# Patient Record
Sex: FEMALE | Race: OTHER | HISPANIC OR LATINO | URBAN - METROPOLITAN AREA
[De-identification: names, ages, dates, MRNs, and addresses within clinical notes are randomized per-mention and may not be internally consistent; named-entity substitution may affect disease eponyms.]

---

## 2023-01-19 PROBLEM — Z00.00 ENCOUNTER FOR PREVENTIVE HEALTH EXAMINATION: Status: ACTIVE | Noted: 2023-01-19

## 2023-01-20 ENCOUNTER — OUTPATIENT (OUTPATIENT)
Dept: INPATIENT UNIT | Facility: HOSPITAL | Age: 33
LOS: 1 days | End: 2023-01-20
Payer: COMMERCIAL

## 2023-01-20 VITALS — SYSTOLIC BLOOD PRESSURE: 101 MMHG | HEART RATE: 94 BPM | DIASTOLIC BLOOD PRESSURE: 63 MMHG

## 2023-01-20 VITALS
TEMPERATURE: 98 F | RESPIRATION RATE: 18 BRPM | DIASTOLIC BLOOD PRESSURE: 60 MMHG | SYSTOLIC BLOOD PRESSURE: 101 MMHG | HEART RATE: 85 BPM

## 2023-01-20 DIAGNOSIS — O47.02 FALSE LABOR BEFORE 37 COMPLETED WEEKS OF GESTATION, SECOND TRIMESTER: ICD-10-CM

## 2023-01-20 LAB
A1C WITH ESTIMATED AVERAGE GLUCOSE RESULT: 4.8 % — SIGNIFICANT CHANGE UP (ref 4–5.6)
ABO RH CONFIRMATION: SIGNIFICANT CHANGE UP
APPEARANCE UR: CLEAR — SIGNIFICANT CHANGE UP
APTT BLD: 32.3 SEC — SIGNIFICANT CHANGE UP (ref 27.5–35.5)
BACTERIA # UR AUTO: ABNORMAL
BASOPHILS # BLD AUTO: 0.01 K/UL — SIGNIFICANT CHANGE UP (ref 0–0.2)
BASOPHILS NFR BLD AUTO: 0.1 % — SIGNIFICANT CHANGE UP (ref 0–2)
BILIRUB UR-MCNC: NEGATIVE — SIGNIFICANT CHANGE UP
BLD GP AB SCN SERPL QL: SIGNIFICANT CHANGE UP
COLOR SPEC: YELLOW — SIGNIFICANT CHANGE UP
COMMENT - URINE: SIGNIFICANT CHANGE UP
DIFF PNL FLD: ABNORMAL
EOSINOPHIL # BLD AUTO: 0.09 K/UL — SIGNIFICANT CHANGE UP (ref 0–0.5)
EOSINOPHIL NFR BLD AUTO: 1.3 % — SIGNIFICANT CHANGE UP (ref 0–6)
EPI CELLS # UR: ABNORMAL
ESTIMATED AVERAGE GLUCOSE: 91 MG/DL — SIGNIFICANT CHANGE UP (ref 68–114)
FIBRINOGEN PPP-MCNC: 808 MG/DL — CRITICAL HIGH (ref 330–520)
FIBRINOGEN PPP-MCNC: 818 MG/DL — CRITICAL HIGH (ref 330–520)
GLUCOSE UR QL: NEGATIVE MG/DL — SIGNIFICANT CHANGE UP
HCT VFR BLD CALC: 29.5 % — LOW (ref 34.5–45)
HCT VFR BLD CALC: 29.7 % — LOW (ref 34.5–45)
HGB BLD-MCNC: 10.6 G/DL — LOW (ref 11.5–15.5)
HGB BLD-MCNC: 10.7 G/DL — LOW (ref 11.5–15.5)
IMM GRANULOCYTES NFR BLD AUTO: 0.6 % — SIGNIFICANT CHANGE UP (ref 0–0.9)
INR BLD: 1.1 RATIO — SIGNIFICANT CHANGE UP (ref 0.88–1.16)
INR BLD: 1.12 RATIO — SIGNIFICANT CHANGE UP (ref 0.88–1.16)
KETONES UR-MCNC: NEGATIVE — SIGNIFICANT CHANGE UP
LEUKOCYTE ESTERASE UR-ACNC: ABNORMAL
LYMPHOCYTES # BLD AUTO: 1.36 K/UL — SIGNIFICANT CHANGE UP (ref 1–3.3)
LYMPHOCYTES # BLD AUTO: 19.3 % — SIGNIFICANT CHANGE UP (ref 13–44)
MCHC RBC-ENTMCNC: 33 PG — SIGNIFICANT CHANGE UP (ref 27–34)
MCHC RBC-ENTMCNC: 33.4 PG — SIGNIFICANT CHANGE UP (ref 27–34)
MCHC RBC-ENTMCNC: 35.7 GM/DL — SIGNIFICANT CHANGE UP (ref 32–36)
MCHC RBC-ENTMCNC: 36.3 GM/DL — HIGH (ref 32–36)
MCV RBC AUTO: 92.2 FL — SIGNIFICANT CHANGE UP (ref 80–100)
MCV RBC AUTO: 92.5 FL — SIGNIFICANT CHANGE UP (ref 80–100)
MONOCYTES # BLD AUTO: 0.29 K/UL — SIGNIFICANT CHANGE UP (ref 0–0.9)
MONOCYTES NFR BLD AUTO: 4.1 % — SIGNIFICANT CHANGE UP (ref 2–14)
NEUTROPHILS # BLD AUTO: 5.26 K/UL — SIGNIFICANT CHANGE UP (ref 1.8–7.4)
NEUTROPHILS NFR BLD AUTO: 74.6 % — SIGNIFICANT CHANGE UP (ref 43–77)
NITRITE UR-MCNC: NEGATIVE — SIGNIFICANT CHANGE UP
PH UR: 7 — SIGNIFICANT CHANGE UP (ref 5–8)
PLATELET # BLD AUTO: 202 K/UL — SIGNIFICANT CHANGE UP (ref 150–400)
PLATELET # BLD AUTO: 236 K/UL — SIGNIFICANT CHANGE UP (ref 150–400)
PROT UR-MCNC: NEGATIVE — SIGNIFICANT CHANGE UP
PROTHROM AB SERPL-ACNC: 12.8 SEC — SIGNIFICANT CHANGE UP (ref 10.5–13.4)
PROTHROM AB SERPL-ACNC: 13 SEC — SIGNIFICANT CHANGE UP (ref 10.5–13.4)
RBC # BLD: 3.2 M/UL — LOW (ref 3.8–5.2)
RBC # BLD: 3.21 M/UL — LOW (ref 3.8–5.2)
RBC # FLD: 13 % — SIGNIFICANT CHANGE UP (ref 10.3–14.5)
RBC # FLD: 13 % — SIGNIFICANT CHANGE UP (ref 10.3–14.5)
RBC CASTS # UR COMP ASSIST: ABNORMAL /HPF (ref 0–4)
SP GR SPEC: 1 — LOW (ref 1.01–1.02)
UROBILINOGEN FLD QL: NEGATIVE MG/DL — SIGNIFICANT CHANGE UP
WBC # BLD: 7.05 K/UL — SIGNIFICANT CHANGE UP (ref 3.8–10.5)
WBC # BLD: 7.45 K/UL — SIGNIFICANT CHANGE UP (ref 3.8–10.5)
WBC # FLD AUTO: 7.05 K/UL — SIGNIFICANT CHANGE UP (ref 3.8–10.5)
WBC # FLD AUTO: 7.45 K/UL — SIGNIFICANT CHANGE UP (ref 3.8–10.5)
WBC UR QL: ABNORMAL /HPF (ref 0–5)

## 2023-01-20 PROCEDURE — 87800 DETECT AGNT MULT DNA DIREC: CPT

## 2023-01-20 PROCEDURE — 86850 RBC ANTIBODY SCREEN: CPT

## 2023-01-20 PROCEDURE — 86901 BLOOD TYPING SEROLOGIC RH(D): CPT

## 2023-01-20 PROCEDURE — 85025 COMPLETE CBC W/AUTO DIFF WBC: CPT

## 2023-01-20 PROCEDURE — 87591 N.GONORRHOEAE DNA AMP PROB: CPT

## 2023-01-20 PROCEDURE — 36415 COLL VENOUS BLD VENIPUNCTURE: CPT

## 2023-01-20 PROCEDURE — 87491 CHLMYD TRACH DNA AMP PROBE: CPT

## 2023-01-20 PROCEDURE — 85730 THROMBOPLASTIN TIME PARTIAL: CPT

## 2023-01-20 PROCEDURE — 76816 OB US FOLLOW-UP PER FETUS: CPT | Mod: 26

## 2023-01-20 PROCEDURE — 85027 COMPLETE CBC AUTOMATED: CPT

## 2023-01-20 PROCEDURE — 81001 URINALYSIS AUTO W/SCOPE: CPT

## 2023-01-20 PROCEDURE — 86900 BLOOD TYPING SEROLOGIC ABO: CPT

## 2023-01-20 PROCEDURE — 85610 PROTHROMBIN TIME: CPT

## 2023-01-20 PROCEDURE — 85384 FIBRINOGEN ACTIVITY: CPT

## 2023-01-20 PROCEDURE — 83036 HEMOGLOBIN GLYCOSYLATED A1C: CPT

## 2023-01-20 RX ORDER — FERROUS SULFATE 325(65) MG
1 TABLET ORAL
Qty: 30 | Refills: 3
Start: 2023-01-20 | End: 2023-05-19

## 2023-01-20 NOTE — OB PROVIDER TRIAGE NOTE - NSHPPHYSICALEXAM_GEN_ALL_CORE
ICU Vital Signs Last 24 Hrs  T(C): 36.4 (20 Jan 2023 09:25), Max: 36.4 (20 Jan 2023 09:25)  T(F): 97.5 (20 Jan 2023 09:25), Max: 97.5 (20 Jan 2023 09:25)  HR: 85 (20 Jan 2023 09:31) (85 - 85)  BP: 101/60 (20 Jan 2023 09:31) (101/60 - 101/60)  RR: 18 (20 Jan 2023 09:25) (18 - 18)  O2 Parameters below as of 20 Jan 2023 09:25  Patient On (Oxygen Delivery Method): room air    CV: RRR  Pulm: breathing comfortably on RA  Abd: gravid, nontender  Extr: moving all extremities with ease   – Spec:   – VE: //  – FHT: baseline 140, mod variability, +accels, -decels  – [place]: No contractions noted  – Sono:

## 2023-01-20 NOTE — OB PROVIDER TRIAGE NOTE - NS_FINALEDD_OBGYN_ALL_OB_DT
Problem: Nutrition  Goal: Optimal nutrition therapy  Outcome: Ongoing  Note: Nutrition Problem #1: Inadequate oral intake  Intervention: Food and/or Nutrient Delivery: Continue Current Diet  Nutritional Goals: Pt will consume greater than 50% of meals this ARU stay 30-Apr-2023

## 2023-01-20 NOTE — OB PROVIDER TRIAGE NOTE - NSOBPROVIDERNOTE_OBGYN_ALL_OB_FT
33 y/o  at 25wks 5d gestation presents to triage for vaginal bleeding    - UA, CBC, T&S and coags collected will f/u results   - [place]:   - NST  - Sono       Discussed and seen with     ID: [id number] 33 y/o  at 25wks 5d gestation presents to triage for vaginal bleeding    - UA, CBC, T&S and coags collected will f/u results      ID: [id number], Nydia    Update by Ob Resident:  Labs with Hgb 10.7, coags within normal limits, O positive   MFM sono performed: anterior placenta, no previa; transverse maternal right presentation; EFW 1071g CWD, f gender, CL 3.57cm, MVP 8.37cm  SSE: old, brown blood visible in vaginal vault, nothing active from cervical os  VE: cervix soft, multiparous, closed    A/P:  - no active bleeding visualized on exam, cervix within normal limits on TVUS   - rpt abruption labs in 2h to assess for concealed abruption  - continuous fetal and uterine monitoring    discussed with Dr. Nick 31 y/o  at 25wks 5d gestation presents to triage for vaginal bleeding    - UA, CBC, T&S and coags collected will f/u results      ID: [id number], Nydia    Update by Ob Resident:  Labs with Hgb 10.7, coags within normal limits, O positive   MFM sono performed: anterior placenta, no previa; transverse maternal right presentation; EFW 1071g CWD, f gender, CL 3.57cm, MVP 8.37cm  SSE: old, brown blood visible in vaginal vault, nothing active from cervical os  VE: cervix soft, multiparous, closed    A/P:  - no active bleeding visualized on exam, cervix within normal limits on TVUS   - rpt abruption labs in 2h to assess for concealed abruption  - continuous fetal and uterine monitoring    discussed with Dr. Nick    Addendum:  - patient labs show Hgb stable without any signs of a concealed abruption. Patient was counseled to follow up outpatient and return to L&D for vaginal bleeding, leakage of fluid, contractions or decreased fetal movement.   - po iron was prescribed    d/w Dr. Nick

## 2023-01-20 NOTE — OB PROVIDER TRIAGE NOTE - HISTORY OF PRESENT ILLNESS
32 yoF  at gestational age presents to triage with reports of vaginal bleeding. patient states that when she woke up this morning she noticed there was blood on her bedsheets and underwear and noticed blood after wiping.  She declines vaginal bleeding now since she changed her underwear at 0830 AM today. She also states that she had abdominal cramping last night nothing today. She declines having sexual intercourse or anything in the vagina in the last 24 hrs, and declines any abdominal trauma. She recently came to the  from [place] where she had prenatal care and does not have any records with her. She was seen at [facility] clinic for confirmation for pregnancy and has a follow up appt for 23. +FM. -LOF. -CTXs. Pt denies any other concerns.    – PNC: Denies prenatal issues.   – OBHx: 2 , hx of 35 wk delivery  – GynHx: denies  – PMH: denies  – PSH: denies  – Psych: denies   – Social: denies   – Meds: Folic acid   – Allergies: NKDA

## 2023-01-21 LAB
C TRACH RRNA SPEC QL NAA+PROBE: SIGNIFICANT CHANGE UP
N GONORRHOEA RRNA SPEC QL NAA+PROBE: SIGNIFICANT CHANGE UP
SPECIMEN SOURCE: SIGNIFICANT CHANGE UP

## 2023-01-24 ENCOUNTER — APPOINTMENT (OUTPATIENT)
Dept: ANTEPARTUM | Facility: CLINIC | Age: 33
End: 2023-01-24
Payer: MEDICAID

## 2023-01-24 ENCOUNTER — ASOB RESULT (OUTPATIENT)
Age: 33
End: 2023-01-24

## 2023-01-24 PROCEDURE — 76811 OB US DETAILED SNGL FETUS: CPT

## 2023-03-10 ENCOUNTER — APPOINTMENT (OUTPATIENT)
Dept: MATERNAL FETAL MEDICINE | Facility: CLINIC | Age: 33
End: 2023-03-10

## 2023-03-14 ENCOUNTER — INPATIENT (INPATIENT)
Facility: HOSPITAL | Age: 33
LOS: 3 days | Discharge: HOME | DRG: 831 | End: 2023-03-18
Attending: OBSTETRICS & GYNECOLOGY | Admitting: OBSTETRICS & GYNECOLOGY
Payer: COMMERCIAL

## 2023-03-14 VITALS — DIASTOLIC BLOOD PRESSURE: 66 MMHG | SYSTOLIC BLOOD PRESSURE: 107 MMHG | HEART RATE: 86 BPM

## 2023-03-14 DIAGNOSIS — Z3A.33 33 WEEKS GESTATION OF PREGNANCY: ICD-10-CM

## 2023-03-14 DIAGNOSIS — O26.893 OTHER SPECIFIED PREGNANCY RELATED CONDITIONS, THIRD TRIMESTER: ICD-10-CM

## 2023-03-14 DIAGNOSIS — Z29.9 ENCOUNTER FOR PROPHYLACTIC MEASURES, UNSPECIFIED: ICD-10-CM

## 2023-03-14 DIAGNOSIS — O24.419 GESTATIONAL DIABETES MELLITUS IN PREGNANCY, UNSPECIFIED CONTROL: ICD-10-CM

## 2023-03-14 DIAGNOSIS — O60.03 PRETERM LABOR WITHOUT DELIVERY, THIRD TRIMESTER: ICD-10-CM

## 2023-03-14 DIAGNOSIS — O47.03 FALSE LABOR BEFORE 37 COMPLETED WEEKS OF GESTATION, THIRD TRIMESTER: ICD-10-CM

## 2023-03-14 DIAGNOSIS — E66.9 OBESITY, UNSPECIFIED: ICD-10-CM

## 2023-03-14 LAB
A1C WITH ESTIMATED AVERAGE GLUCOSE RESULT: 6 % — HIGH (ref 4–5.6)
ALBUMIN SERPL ELPH-MCNC: 3.4 G/DL — SIGNIFICANT CHANGE UP (ref 3.3–5.2)
ALP SERPL-CCNC: 325 U/L — HIGH (ref 40–120)
ALT FLD-CCNC: 9 U/L — SIGNIFICANT CHANGE UP
AMPHET UR-MCNC: NEGATIVE — SIGNIFICANT CHANGE UP
ANION GAP SERPL CALC-SCNC: 14 MMOL/L — SIGNIFICANT CHANGE UP (ref 5–17)
APPEARANCE UR: ABNORMAL
APTT BLD: 31.8 SEC — SIGNIFICANT CHANGE UP (ref 27.5–35.5)
AST SERPL-CCNC: 12 U/L — SIGNIFICANT CHANGE UP
BACTERIA # UR AUTO: ABNORMAL
BARBITURATES UR SCN-MCNC: NEGATIVE — SIGNIFICANT CHANGE UP
BASOPHILS # BLD AUTO: 0.01 K/UL — SIGNIFICANT CHANGE UP (ref 0–0.2)
BASOPHILS NFR BLD AUTO: 0.1 % — SIGNIFICANT CHANGE UP (ref 0–2)
BENZODIAZ UR-MCNC: NEGATIVE — SIGNIFICANT CHANGE UP
BILIRUB SERPL-MCNC: 0.4 MG/DL — SIGNIFICANT CHANGE UP (ref 0.4–2)
BILIRUB UR-MCNC: NEGATIVE — SIGNIFICANT CHANGE UP
BLD GP AB SCN SERPL QL: SIGNIFICANT CHANGE UP
BUN SERPL-MCNC: 5.4 MG/DL — LOW (ref 8–20)
CALCIUM SERPL-MCNC: 9 MG/DL — SIGNIFICANT CHANGE UP (ref 8.4–10.5)
CHLORIDE SERPL-SCNC: 101 MMOL/L — SIGNIFICANT CHANGE UP (ref 96–108)
CO2 SERPL-SCNC: 19 MMOL/L — LOW (ref 22–29)
COCAINE METAB.OTHER UR-MCNC: NEGATIVE — SIGNIFICANT CHANGE UP
COLOR SPEC: YELLOW — SIGNIFICANT CHANGE UP
COVID-19 SPIKE DOMAIN AB INTERP: POSITIVE
COVID-19 SPIKE DOMAIN ANTIBODY RESULT: >250 U/ML — HIGH
CREAT SERPL-MCNC: 0.39 MG/DL — LOW (ref 0.5–1.3)
DIFF PNL FLD: NEGATIVE — SIGNIFICANT CHANGE UP
EGFR: 136 ML/MIN/1.73M2 — SIGNIFICANT CHANGE UP
EOSINOPHIL # BLD AUTO: 0.07 K/UL — SIGNIFICANT CHANGE UP (ref 0–0.5)
EOSINOPHIL NFR BLD AUTO: 1 % — SIGNIFICANT CHANGE UP (ref 0–6)
EPI CELLS # UR: SIGNIFICANT CHANGE UP
ESTIMATED AVERAGE GLUCOSE: 126 MG/DL — HIGH (ref 68–114)
FIBRINOGEN PPP-MCNC: 575 MG/DL — HIGH (ref 200–450)
GLUCOSE SERPL-MCNC: 126 MG/DL — HIGH (ref 70–99)
GLUCOSE UR QL: NEGATIVE MG/DL — SIGNIFICANT CHANGE UP
HCT VFR BLD CALC: 35.5 % — SIGNIFICANT CHANGE UP (ref 34.5–45)
HGB BLD-MCNC: 12.3 G/DL — SIGNIFICANT CHANGE UP (ref 11.5–15.5)
HIV 1 & 2 AB SERPL IA.RAPID: SIGNIFICANT CHANGE UP
IMM GRANULOCYTES NFR BLD AUTO: 0.7 % — SIGNIFICANT CHANGE UP (ref 0–0.9)
KETONES UR-MCNC: ABNORMAL
LEUKOCYTE ESTERASE UR-ACNC: ABNORMAL
LYMPHOCYTES # BLD AUTO: 1.59 K/UL — SIGNIFICANT CHANGE UP (ref 1–3.3)
LYMPHOCYTES # BLD AUTO: 21.6 % — SIGNIFICANT CHANGE UP (ref 13–44)
MCHC RBC-ENTMCNC: 32.5 PG — SIGNIFICANT CHANGE UP (ref 27–34)
MCHC RBC-ENTMCNC: 34.6 GM/DL — SIGNIFICANT CHANGE UP (ref 32–36)
MCV RBC AUTO: 93.7 FL — SIGNIFICANT CHANGE UP (ref 80–100)
METHADONE UR-MCNC: NEGATIVE — SIGNIFICANT CHANGE UP
MONOCYTES # BLD AUTO: 0.36 K/UL — SIGNIFICANT CHANGE UP (ref 0–0.9)
MONOCYTES NFR BLD AUTO: 4.9 % — SIGNIFICANT CHANGE UP (ref 2–14)
NEUTROPHILS # BLD AUTO: 5.27 K/UL — SIGNIFICANT CHANGE UP (ref 1.8–7.4)
NEUTROPHILS NFR BLD AUTO: 71.7 % — SIGNIFICANT CHANGE UP (ref 43–77)
NITRITE UR-MCNC: NEGATIVE — SIGNIFICANT CHANGE UP
OPIATES UR-MCNC: NEGATIVE — SIGNIFICANT CHANGE UP
PCP SPEC-MCNC: SIGNIFICANT CHANGE UP
PCP UR-MCNC: NEGATIVE — SIGNIFICANT CHANGE UP
PH UR: 7 — SIGNIFICANT CHANGE UP (ref 5–8)
PLATELET # BLD AUTO: 249 K/UL — SIGNIFICANT CHANGE UP (ref 150–400)
POTASSIUM SERPL-MCNC: 3.5 MMOL/L — SIGNIFICANT CHANGE UP (ref 3.5–5.3)
POTASSIUM SERPL-SCNC: 3.5 MMOL/L — SIGNIFICANT CHANGE UP (ref 3.5–5.3)
PROT SERPL-MCNC: 6.8 G/DL — SIGNIFICANT CHANGE UP (ref 6.6–8.7)
PROT UR-MCNC: 30 MG/DL
RBC # BLD: 3.79 M/UL — LOW (ref 3.8–5.2)
RBC # FLD: 12.9 % — SIGNIFICANT CHANGE UP (ref 10.3–14.5)
RBC CASTS # UR COMP ASSIST: NEGATIVE /HPF — SIGNIFICANT CHANGE UP (ref 0–4)
SARS-COV-2 IGG+IGM SERPL QL IA: >250 U/ML — HIGH
SARS-COV-2 IGG+IGM SERPL QL IA: POSITIVE
SARS-COV-2 RNA SPEC QL NAA+PROBE: SIGNIFICANT CHANGE UP
SODIUM SERPL-SCNC: 134 MMOL/L — LOW (ref 135–145)
SP GR SPEC: 1.01 — SIGNIFICANT CHANGE UP (ref 1.01–1.02)
THC UR QL: NEGATIVE — SIGNIFICANT CHANGE UP
URATE SERPL-MCNC: 2.4 MG/DL — SIGNIFICANT CHANGE UP (ref 2.4–5.7)
UROBILINOGEN FLD QL: NEGATIVE MG/DL — SIGNIFICANT CHANGE UP
WBC # BLD: 7.35 K/UL — SIGNIFICANT CHANGE UP (ref 3.8–10.5)
WBC # FLD AUTO: 7.35 K/UL — SIGNIFICANT CHANGE UP (ref 3.8–10.5)
WBC UR QL: ABNORMAL /HPF (ref 0–5)

## 2023-03-14 PROCEDURE — 99233 SBSQ HOSP IP/OBS HIGH 50: CPT | Mod: GC

## 2023-03-14 RX ORDER — DEXTROSE 50 % IN WATER 50 %
25 SYRINGE (ML) INTRAVENOUS ONCE
Refills: 0 | Status: DISCONTINUED | OUTPATIENT
Start: 2023-03-14 | End: 2023-03-18

## 2023-03-14 RX ORDER — SODIUM CHLORIDE 9 MG/ML
1000 INJECTION, SOLUTION INTRAVENOUS
Refills: 0 | Status: DISCONTINUED | OUTPATIENT
Start: 2023-03-14 | End: 2023-03-15

## 2023-03-14 RX ORDER — AMPICILLIN TRIHYDRATE 250 MG
2 CAPSULE ORAL ONCE
Refills: 0 | Status: COMPLETED | OUTPATIENT
Start: 2023-03-14 | End: 2023-03-14

## 2023-03-14 RX ORDER — NIFEDIPINE 30 MG
30 TABLET, EXTENDED RELEASE 24 HR ORAL ONCE
Refills: 0 | Status: COMPLETED | OUTPATIENT
Start: 2023-03-14 | End: 2023-03-14

## 2023-03-14 RX ORDER — DEXTROSE 50 % IN WATER 50 %
12.5 SYRINGE (ML) INTRAVENOUS ONCE
Refills: 0 | Status: DISCONTINUED | OUTPATIENT
Start: 2023-03-14 | End: 2023-03-18

## 2023-03-14 RX ORDER — DEXTROSE 50 % IN WATER 50 %
15 SYRINGE (ML) INTRAVENOUS ONCE
Refills: 0 | Status: DISCONTINUED | OUTPATIENT
Start: 2023-03-14 | End: 2023-03-18

## 2023-03-14 RX ORDER — SODIUM CHLORIDE 9 MG/ML
1000 INJECTION, SOLUTION INTRAVENOUS
Refills: 0 | Status: DISCONTINUED | OUTPATIENT
Start: 2023-03-14 | End: 2023-03-18

## 2023-03-14 RX ORDER — GLUCAGON INJECTION, SOLUTION 0.5 MG/.1ML
1 INJECTION, SOLUTION SUBCUTANEOUS ONCE
Refills: 0 | Status: DISCONTINUED | OUTPATIENT
Start: 2023-03-14 | End: 2023-03-18

## 2023-03-14 RX ORDER — BUTORPHANOL TARTRATE 2 MG/ML
2 INJECTION, SOLUTION INTRAMUSCULAR; INTRAVENOUS ONCE
Refills: 0 | Status: DISCONTINUED | OUTPATIENT
Start: 2023-03-14 | End: 2023-03-14

## 2023-03-14 RX ORDER — NIFEDIPINE 30 MG
20 TABLET, EXTENDED RELEASE 24 HR ORAL EVERY 6 HOURS
Refills: 0 | Status: DISCONTINUED | OUTPATIENT
Start: 2023-03-14 | End: 2023-03-15

## 2023-03-14 RX ORDER — INSULIN LISPRO 100/ML
VIAL (ML) SUBCUTANEOUS
Refills: 0 | Status: DISCONTINUED | OUTPATIENT
Start: 2023-03-14 | End: 2023-03-15

## 2023-03-14 RX ORDER — AMPICILLIN TRIHYDRATE 250 MG
1 CAPSULE ORAL EVERY 4 HOURS
Refills: 0 | Status: DISCONTINUED | OUTPATIENT
Start: 2023-03-14 | End: 2023-03-15

## 2023-03-14 RX ORDER — SODIUM CHLORIDE 9 MG/ML
1000 INJECTION, SOLUTION INTRAVENOUS ONCE
Refills: 0 | Status: COMPLETED | OUTPATIENT
Start: 2023-03-14 | End: 2023-03-14

## 2023-03-14 RX ORDER — INSULIN LISPRO 100/ML
VIAL (ML) SUBCUTANEOUS AT BEDTIME
Refills: 0 | Status: DISCONTINUED | OUTPATIENT
Start: 2023-03-14 | End: 2023-03-15

## 2023-03-14 RX ADMIN — Medication 12 MILLIGRAM(S): at 15:03

## 2023-03-14 RX ADMIN — Medication 200 GRAM(S): at 15:00

## 2023-03-14 RX ADMIN — SODIUM CHLORIDE 1000 MILLILITER(S): 9 INJECTION, SOLUTION INTRAVENOUS at 10:50

## 2023-03-14 RX ADMIN — Medication 20 MILLIGRAM(S): at 21:58

## 2023-03-14 RX ADMIN — Medication 108 GRAM(S): at 18:45

## 2023-03-14 RX ADMIN — Medication 30 MILLIGRAM(S): at 16:04

## 2023-03-14 RX ADMIN — Medication 1 TABLET(S): at 21:58

## 2023-03-14 RX ADMIN — BUTORPHANOL TARTRATE 2 MILLIGRAM(S): 2 INJECTION, SOLUTION INTRAMUSCULAR; INTRAVENOUS at 11:41

## 2023-03-14 RX ADMIN — Medication 108 GRAM(S): at 21:58

## 2023-03-14 NOTE — CONSULT NOTE ADULT - ATTENDING COMMENTS
Pregnancy complicated by  labor and other comorbidities. Tocolysis, GBS prophylaxis, and steroids for fetal lung maturity.

## 2023-03-14 NOTE — OB PROVIDER TRIAGE NOTE - NSHPPHYSICALEXAM_GEN_ALL_CORE
T(C): 36.8 (03-14-23 @ 10:32), Max: 36.8 (03-14-23 @ 10:32)  HR: 74 (03-14-23 @ 11:12) (72 - 86)  BP: 107/66 (03-14-23 @ 10:32) (107/66 - 107/66)  RR: 19 (03-14-23 @ 10:32) (19 - 19)  SpO2: 98% (03-14-23 @ 11:07) (98% - 98%)  Gen: NAD, well-appearing  Heart: S1 S2, RRR  Lungs: CTAB  Abd: soft, gravid  Ext: non-edematous, non-tender   SVE: closed  SSE: cervix visualized, closed and without any signs of bleeding or drainage, no pooling   FHT: Baseline 160bpm. moderate variability. +accels -decels T(C): 36.8 (03-14-23 @ 10:32), Max: 36.8 (03-14-23 @ 10:32)  HR: 74 (03-14-23 @ 11:12) (72 - 86)  BP: 107/66 (03-14-23 @ 10:32) (107/66 - 107/66)  RR: 19 (03-14-23 @ 10:32) (19 - 19)  SpO2: 98% (03-14-23 @ 11:07) (98% - 98%)    Gen: NAD, well-appearing, resting comfortably on room air   Abd: soft, gravid  Ext: non-edematous, non-tender   SVE: 0/0/-3  FHT: Baseline 160bpm. moderate variability. +accels -decels

## 2023-03-14 NOTE — CONSULT NOTE ADULT - PROBLEM SELECTOR RECOMMENDATION 2
- will start fingersticks every 4 hours while NPO  - will do fingersticks pre and post meals, fasting and at bedtime - will start fingersticks every 4 hours while NPO  - will do fingersticks pre and post meals, fasting and at bedtime when eating

## 2023-03-14 NOTE — OB PROVIDER H&P - ASSESSMENT
Patient is a 32 year old  at 33weeks 6days who presented to L&D for contractions and now admitted for  labor after making cervical change.     - MFM consult  - gbs prophylaxis, ampicillin, tocolysis  - fingersticks q4 and pre/post meals for GDMA  - continuous monitoring    d/w Dr. Sauceda

## 2023-03-14 NOTE — OB RN TRIAGE NOTE - NS_ACCOMPAINEDBY_OBGYN_ALL_OB
Pt aware of message below and verbalized understanding. No further questions or concerns from pt at this time. Self

## 2023-03-14 NOTE — OB PROVIDER H&P - NSHPPHYSICALEXAM_GEN_ALL_CORE
Vital Signs Last 24 Hrs  T(C): 36.8 (14 Mar 2023 13:42), Max: 36.8 (14 Mar 2023 10:32)  T(F): 98.2 (14 Mar 2023 13:42), Max: 98.2 (14 Mar 2023 10:32)  HR: 90 (14 Mar 2023 13:44) (72 - 90)  BP: 111/75 (14 Mar 2023 13:44) (107/66 - 111/75)  RR: 18 (14 Mar 2023 16:00) (17 - 19)  SpO2: 99% (14 Mar 2023 11:28) (98% - 99%)  Gen: NAD, well-appearing, resting comfortably on room air   Abd: soft, gravid  Ext: non-edematous, non-tender   SVE: 0/0/-3 > 2/70/-  FHT: Baseline 160bpm. moderate variability. +accels -decels

## 2023-03-14 NOTE — CONSULT NOTE ADULT - ASSESSMENT
Patient is a 32 year old  at 33weeks 6days who presented to L&D for contractions. She received 2mg stadol for pain, however she continued to have pain and contractions every 2 minutes.  Patient is a 32 year old  at 33weeks 6days who presented to L&D with premature uterine contractions. She received 2mg Stadol for pain. She has  labor.

## 2023-03-14 NOTE — CONSULT NOTE ADULT - SUBJECTIVE AND OBJECTIVE BOX
KATE HUGGINS is a 32y  at 33weeks 6days GA who presents to L&D for contractions and low back pain every 5 minutes since yesterday morning (>24h ago), became intolerable at 4AM this morning, rates as 6/10 pain. Also endorsing pressure described as an urge to urinate and pass a bowel movement. Patient denies vaginal bleeding or leakage of fluid. She endorses good fetal movement, last felt earlier this morning. Patient denies any trauma, visual disturbances, RUQ pain, epigastric pain, or new-onset edema. She denies any urinary complaints, fevers, chills, nausea, vomiting, chest pain, or palpitations.    KIERA: 23  LMP: 22    Pregnancy course is significant for:   - GDMA1    POB:  x2 G2:  @ 36w 2/2 vaginal bleeding in 2nd and 3rd trimester  PGYN: -fibroids/-cysts, denied STD hx, denies abnormal PAPs  PMH: denies   PSH: denies   SH: Denies tobacco use, EtOH use and illicit drug use during the pregnancy; Feels safe at home  Meds: Prenatal vitamins  All: NKDA    Vital Signs Last 24 Hrs  T(C): 36.8 (14 Mar 2023 13:42), Max: 36.8 (14 Mar 2023 10:32)  T(F): 98.2 (14 Mar 2023 13:42), Max: 98.2 (14 Mar 2023 10:32)  HR: 90 (14 Mar 2023 13:44) (72 - 90)  BP: 111/75 (14 Mar 2023 13:44) (107/66 - 111/75)  RR: 18 (14 Mar 2023 13:42) (18 - 19)  SpO2: 99% (14 Mar 2023 11:28) (98% - 99%)    Gen: NAD, well-appearing, resting comfortably on room air   Abd: soft, gravid  Ext: non-edematous, non-tender   SVE: 2/70/-2  FHT: Baseline 160bpm. moderate variability. +accels -decels  toco: contractions q2 min     KATE HUGGINS is a 32y  at 33weeks 6days GA who presents to L&D with back pain due to regular uterine contractions every 5 minutes since yesterday morning (>24h ago). Uterine contraction intensity increased this morning at 4 AM, rates as 6/10 pain. Also endorsing pelvic pressure described as an urge to urinate and have a bowel movement. Patient denies vaginal bleeding or leakage of fluid. She endorses good fetal movement, last felt earlier this morning. Patient denies any trauma, visual disturbances, RUQ pain, epigastric pain, or new-onset edema. She denies any urinary complaints, fevers, chills, nausea, vomiting, chest pain, or palpitations.    KIERA: 23  LMP: 22    Pregnancy course is significant for:   - GDMA1    POB:  x2 G2:  @ 36w 2/2 vaginal bleeding in 2nd and 3rd trimester  PGYN: -fibroids/-cysts, denied STD hx, denies abnormal PAPs  PMH: denies   PSH: denies   SH: Denies tobacco use, EtOH use and illicit drug use during the pregnancy; Feels safe at home  Meds: Prenatal vitamins  All: NKDA    Vital Signs Last 24 Hrs  T(C): 36.8 (14 Mar 2023 13:42), Max: 36.8 (14 Mar 2023 10:32)  T(F): 98.2 (14 Mar 2023 13:42), Max: 98.2 (14 Mar 2023 10:32)  HR: 90 (14 Mar 2023 13:44) (72 - 90)  BP: 111/75 (14 Mar 2023 13:44) (107/66 - 111/75)  RR: 18 (14 Mar 2023 13:42) (18 - 19)  SpO2: 99% (14 Mar 2023 11:28) (98% - 99%)    Gen: NAD, well-appearing, resting comfortably on room air   Abd: soft, gravid  Ext: non-edematous, non-tender   SVE: 2/70/-2  FHT: Baseline 160bpm. moderate variability. +accels -decels  toco: contractions q2 min

## 2023-03-14 NOTE — OB PROVIDER TRIAGE NOTE - NSOBPROVIDERNOTE_OBGYN_ALL_OB_FT
A/P: 32 year old female  presenting to L&D for rule out labor. Describes contractions every 5 minutes since yesterday morning, worse today at 4AM without any leakage of fluid or vaginal bleeding.      Fetus: 160bpm. moderate variability. +accels -decels     Dispo:   Give LR 1L IV  Give Stadol for pain control  Check labs   Continue to observe.     Discussed with  A/P: 32 year old female  presenting to L&D for rule out labor. Describes contractions every 5 minutes since yesterday morning, worse today at 4AM without any leakage of fluid or vaginal bleeding.      Fetus: 160bpm. moderate variability. +accels -decels     Dispo:   Give LR 1L IV  Give Stadol for pain control  Check cultures  Continue to observe, reassess in 2 hours.     Discussed with  A/P: 32 year old female  at 33w6d presenting to L&D for rule out  labor.    -Give LR 1L bolus  -Give Stadol for pain control  - Pending GBS, Affirm, and GC/CT  - Will reexamine in 2-3 hours for cervical change    Fetus: reactive   Dispo: Continue to observe    Discussed with Dr. Sauceda

## 2023-03-14 NOTE — OB RN TRIAGE NOTE - FALL HARM RISK - UNIVERSAL INTERVENTIONS
Bed in lowest position, wheels locked, appropriate side rails in place/Call bell, personal items and telephone in reach/Instruct patient to call for assistance before getting out of bed or chair/Non-slip footwear when patient is out of bed/[place] to call system/Physically safe environment - no spills, clutter or unnecessary equipment/Purposeful Proactive Rounding/Room/bathroom lighting operational, light cord in reach

## 2023-03-14 NOTE — CONSULT NOTE ADULT - PROBLEM SELECTOR RECOMMENDATION 9
Patient made cervical change from 0 cm to 2cm.  - recommend to start betamethasone course for fetal lung maturity  - ampicillin for GBS prophylaxis  - nifedipine 30 mg loading, 20mg q6hrs for tocolysis

## 2023-03-14 NOTE — CONSULT NOTE ADULT - PROBLEM SELECTOR RECOMMENDATION 5
FIRST PROVIDER CONTACT ASSESSMENT NOTE      Department of Emergency Medicine   2/11/21  5:51 PM EST    Chief Complaint: Headache and Hypertension      History of Present Illness:   Ena Song is a 32 y.o. female who presents to the ED for elevated blood pressure. She was at the gynecologist today and her blood pressure was 150s over 90s. She states she did have a headache but it was due to not wearing her glasses for the past 2 days. She denies any new vision changes, neck pain, shortness breath or chest pain. Currently not on any medications. Medical History:  has no past medical history on file. Surgical History:  has a past surgical history that includes [place] tooth extraction. Social History:  reports that she has been smoking. She has never used smokeless tobacco. She reports current alcohol use. She reports that she does not use drugs. Family History: family history is not on file. *ALLERGIES*     Patient has no known allergies.      Physical Exam:      VS:  BP (!) 145/92   Pulse 93   Temp 97.1 °F (36.2 °C)   Resp 16   Ht 5' 7\" (1.702 m)   Wt (!) 323 lb (146.5 kg)   LMP 02/01/2021 (Exact Date)   SpO2 97%   BMI 50.59 kg/m²      Initial Plan of Care:  Initiate Treatment-Testing, Proceed toTreatment Area When Bed Available for ED Attending/MLP to Continue Care    -----------------END OF FIRST PROVIDER CONTACT ASSESSMENT NOTE--------------  Electronically signed by DONTAE Rubio CNP   DD: 2/11/21             DONTAE Rubio CNP  02/11/21 [id number]
no acute intervention

## 2023-03-14 NOTE — OB PROVIDER H&P - NSLOWPPHRISK_OBGYN_A_OB
No previous uterine incision/V[place] Pregnancy/Less than or equal to 4 previous vaginal births/No known bleeding disorder/No history of postpartum hemorrhage/No other PPH risks indicated

## 2023-03-14 NOTE — OB PROVIDER TRIAGE NOTE - CURRENT PREGNANCY COMPLICATIONS, OB PROFILE
Gestational Diabetes/Gestational Age less than 36 Weeks/Maternal Unknown GBS Subsequent Stages Histo Method Verbiage: Using a similar technique to that described above, a thin layer of tissue was removed from all areas where tumor was visible on the previous stage.  The tissue was again oriented, mapped, dyed, and processed as above.

## 2023-03-14 NOTE — OB PROVIDER H&P - HISTORY OF PRESENT ILLNESS
KATE HUGGINS is a 32y  at 33weeks 6days GA who presents to L&D for contractions and low back pain every 5 minutes since yesterday morning (>24h ago), became intolerable at 4AM this morning, rates as 6/10 pain. Also endorsing pressure described as an urge to urinate and pass a bowel movement. Patient denies vaginal bleeding or leakage of fluid. She endorses good fetal movement, last felt earlier this morning. Patient denies any trauma, visual disturbances, RUQ pain, epigastric pain, or new-onset edema. She denies any urinary complaints, fevers, chills, nausea, vomiting, chest pain, or palpitations.    Pregnancy course is significant for: GDMA1    POB:   G1: 2009 - normal spontaneous vaginal delivery @ 40w, 7lbs  G2: 2015 - normal spontaneous vaginal delivery @ 36w, 4nxa8an  PGYN: -fibroids/-cysts, denied STD hx, denies abnormal PAPs  PMH: denies   PSH: denies   SH: Denies tobacco use, EtOH use and illicit drug use during the pregnancy; Feels safe at home  Meds: Prenatal vitamins  All: NKDA

## 2023-03-14 NOTE — OB PROVIDER TRIAGE NOTE - HISTORY OF PRESENT ILLNESS
KATE HUGGINS is a 32y  at 33weeks 6days GA who presents to L&D for rule out labor. Patient reports contractions and low back pain every 5 minutes since yesterday morning (>24h ago), became intolerable at 4AM this morning. Also endorsing pressure described as an urge to urinate and pass a bowel movement, shortness of breath, and a headache. Patient denies vaginal bleeding or leakage of fluid. She endorses good fetal movement, last felt earlier this morning. Patient denies any trauma, visual disturbances, RUQ pain, epigastric pain, or new-onset edema. She denies any urinary complaints, fevers, chills, nausea, vomiting, chest pain, or palpitations.    Pregnancy course is significant for: GDM1    POB: unknown GBS. Presented to L&D 2023 for episode of vaginal bleeding, ruled out concealed abruption.   PGYN: -fibroids/-cysts, denied STD hx, denies abnormal PAPs  PMH: none  PSH: none  SH: Denies tobacco use, EtOH use and illicit drug use during the pregnancy; Feels safe at home  Meds: Prenatal vitamins  All: none KATE HUGGINS is a 32y  at 33weeks 6days GA who presents to L&D for rule out labor. Patient reports contractions and low back pain every 5 minutes since yesterday morning (>24h ago), became intolerable at 4AM this morning, rates as 6/10 pain. Also endorsing pressure described as an urge to urinate and pass a bowel movement, shortness of breath, and a headache. Patient denies vaginal bleeding or leakage of fluid. She endorses good fetal movement, last felt earlier this morning. Patient denies any trauma, visual disturbances, RUQ pain, epigastric pain, or new-onset edema. She denies any urinary complaints, fevers, chills, nausea, vomiting, chest pain, or palpitations.    Pregnancy course is significant for: GDM1    POB: unknown GBS. Presented to L&D 2023 for episode of vaginal bleeding, ruled out concealed abruption.   PGYN: -fibroids/-cysts, denied STD hx, denies abnormal PAPs  PMH: none  PSH: none  SH: Denies tobacco use, EtOH use and illicit drug use during the pregnancy; Feels safe at home  Meds: Prenatal vitamins  All: none KATE HUGGINS is a 32y  at 33weeks 6days GA who presents to L&D for contractions and low back pain every 5 minutes since yesterday morning (>24h ago), became intolerable at 4AM this morning, rates as 6/10 pain. Also endorsing pressure described as an urge to urinate and pass a bowel movement. Patient denies vaginal bleeding or leakage of fluid. She endorses good fetal movement, last felt earlier this morning. Patient denies any trauma, visual disturbances, RUQ pain, epigastric pain, or new-onset edema. She denies any urinary complaints, fevers, chills, nausea, vomiting, chest pain, or palpitations.    Pregnancy course is significant for: GDM1    POB:  x2 G2:  @ 36w 2/2 vaginal bleeding in 2nd and 3rd trimester  PGYN: -fibroids/-cysts, denied STD hx, denies abnormal PAPs  PMH: denies   PSH: denies   SH: Denies tobacco use, EtOH use and illicit drug use during the pregnancy; Feels safe at home  Meds: Prenatal vitamins  All: NKDA

## 2023-03-15 ENCOUNTER — TRANSCRIPTION ENCOUNTER (OUTPATIENT)
Age: 33
End: 2023-03-15

## 2023-03-15 DIAGNOSIS — Z3A.34 34 WEEKS GESTATION OF PREGNANCY: ICD-10-CM

## 2023-03-15 LAB
APTT BLD: 29.6 SEC — SIGNIFICANT CHANGE UP (ref 27.5–35.5)
BASOPHILS # BLD AUTO: 0.01 K/UL — SIGNIFICANT CHANGE UP (ref 0–0.2)
BASOPHILS NFR BLD AUTO: 0.1 % — SIGNIFICANT CHANGE UP (ref 0–2)
C TRACH RRNA SPEC QL NAA+PROBE: SIGNIFICANT CHANGE UP
CANDIDA AB TITR SER: SIGNIFICANT CHANGE UP
EOSINOPHIL # BLD AUTO: 0 K/UL — SIGNIFICANT CHANGE UP (ref 0–0.5)
EOSINOPHIL NFR BLD AUTO: 0 % — SIGNIFICANT CHANGE UP (ref 0–6)
FIBRINOGEN PPP-MCNC: 638 MG/DL — HIGH (ref 200–450)
G VAGINALIS DNA SPEC QL NAA+PROBE: SIGNIFICANT CHANGE UP
HCT VFR BLD CALC: 33.6 % — LOW (ref 34.5–45)
HGB BLD-MCNC: 11.9 G/DL — SIGNIFICANT CHANGE UP (ref 11.5–15.5)
IMM GRANULOCYTES NFR BLD AUTO: 0.6 % — SIGNIFICANT CHANGE UP (ref 0–0.9)
INR BLD: 1.05 RATIO — SIGNIFICANT CHANGE UP (ref 0.88–1.16)
LYMPHOCYTES # BLD AUTO: 1.35 K/UL — SIGNIFICANT CHANGE UP (ref 1–3.3)
LYMPHOCYTES # BLD AUTO: 17.4 % — SIGNIFICANT CHANGE UP (ref 13–44)
MCHC RBC-ENTMCNC: 33.1 PG — SIGNIFICANT CHANGE UP (ref 27–34)
MCHC RBC-ENTMCNC: 35.4 GM/DL — SIGNIFICANT CHANGE UP (ref 32–36)
MCV RBC AUTO: 93.6 FL — SIGNIFICANT CHANGE UP (ref 80–100)
MONOCYTES # BLD AUTO: 0.37 K/UL — SIGNIFICANT CHANGE UP (ref 0–0.9)
MONOCYTES NFR BLD AUTO: 4.8 % — SIGNIFICANT CHANGE UP (ref 2–14)
N GONORRHOEA RRNA SPEC QL NAA+PROBE: SIGNIFICANT CHANGE UP
NEUTROPHILS # BLD AUTO: 6 K/UL — SIGNIFICANT CHANGE UP (ref 1.8–7.4)
NEUTROPHILS NFR BLD AUTO: 77.1 % — HIGH (ref 43–77)
PLATELET # BLD AUTO: 208 K/UL — SIGNIFICANT CHANGE UP (ref 150–400)
PROTHROM AB SERPL-ACNC: 12.2 SEC — SIGNIFICANT CHANGE UP (ref 10.5–13.4)
RBC # BLD: 3.59 M/UL — LOW (ref 3.8–5.2)
RBC # FLD: 12.6 % — SIGNIFICANT CHANGE UP (ref 10.3–14.5)
SPECIMEN SOURCE: SIGNIFICANT CHANGE UP
T PALLIDUM AB TITR SER: NEGATIVE — SIGNIFICANT CHANGE UP
T VAGINALIS SPEC QL WET PREP: SIGNIFICANT CHANGE UP
WBC # BLD: 7.78 K/UL — SIGNIFICANT CHANGE UP (ref 3.8–10.5)
WBC # FLD AUTO: 7.78 K/UL — SIGNIFICANT CHANGE UP (ref 3.8–10.5)

## 2023-03-15 PROCEDURE — 99232 SBSQ HOSP IP/OBS MODERATE 35: CPT | Mod: GC

## 2023-03-15 RX ORDER — ACETAMINOPHEN 500 MG
975 TABLET ORAL ONCE
Refills: 0 | Status: DISCONTINUED | OUTPATIENT
Start: 2023-03-15 | End: 2023-03-18

## 2023-03-15 RX ORDER — METFORMIN HYDROCHLORIDE 850 MG/1
500 TABLET ORAL EVERY 12 HOURS
Refills: 0 | Status: DISCONTINUED | OUTPATIENT
Start: 2023-03-15 | End: 2023-03-16

## 2023-03-15 RX ORDER — INSULIN LISPRO 100/ML
VIAL (ML) SUBCUTANEOUS
Refills: 0 | Status: DISCONTINUED | OUTPATIENT
Start: 2023-03-15 | End: 2023-03-18

## 2023-03-15 RX ORDER — INSULIN HUMAN 100 [IU]/ML
8 INJECTION, SOLUTION SUBCUTANEOUS ONCE
Refills: 0 | Status: COMPLETED | OUTPATIENT
Start: 2023-03-15 | End: 2023-03-15

## 2023-03-15 RX ADMIN — Medication 6: at 18:49

## 2023-03-15 RX ADMIN — Medication 4: at 13:10

## 2023-03-15 RX ADMIN — Medication 20 MILLIGRAM(S): at 04:01

## 2023-03-15 RX ADMIN — Medication 108 GRAM(S): at 14:15

## 2023-03-15 RX ADMIN — INSULIN HUMAN 8 UNIT(S): 100 INJECTION, SOLUTION SUBCUTANEOUS at 23:05

## 2023-03-15 RX ADMIN — Medication 20 MILLIGRAM(S): at 10:13

## 2023-03-15 RX ADMIN — Medication 12 MILLIGRAM(S): at 14:53

## 2023-03-15 RX ADMIN — Medication 108 GRAM(S): at 06:32

## 2023-03-15 RX ADMIN — Medication 1 TABLET(S): at 13:12

## 2023-03-15 RX ADMIN — Medication 2: at 08:45

## 2023-03-15 RX ADMIN — Medication 108 GRAM(S): at 02:20

## 2023-03-15 RX ADMIN — SODIUM CHLORIDE 125 MILLILITER(S): 9 INJECTION, SOLUTION INTRAVENOUS at 02:20

## 2023-03-15 RX ADMIN — Medication 108 GRAM(S): at 09:59

## 2023-03-15 NOTE — DISCHARGE NOTE ANTEPARTUM - PLAN OF CARE
1) Please take your insulin as prescribed.  2) Please follow you finger sticks in the morning and 1 hour after eating.   3) Please follow a diabetic diet. 1) Nothing in the vagina for 6 weeks (including no sex, no tampons, and no douching).  2) Please call your doctor for a follow up your appointment in 1 week.   3) Please continue taking vitamins postpartum.   4) Please call the office sooner if you have heavy vaginal bleeding, severe abdominal pain, or fever > 100.4F.  5) You may resume regular activity as tolerated

## 2023-03-15 NOTE — DISCHARGE NOTE ANTEPARTUM - PATIENT PORTAL LINK FT
You can access the FollowMyHealth Patient Portal offered by [facility] by registering at the following website: [email or web address]. By joining [facility]’s FollowMyHealth portal, you will also be able to view your health information using other applications (apps) compatible with our system.

## 2023-03-15 NOTE — PROGRESS NOTE ADULT - PROBLEM SELECTOR PLAN 3
-SVE 0cm to 2cm on 3/15  -No contractions on toco overnight, patient declined contractions overnight   -Vaginal -SVE 0cm to 2cm on 3/14 > 3/70/-2 on 3/15  -No contractions on toco overnight, patient declined contractions overnight   -Vaginal bleeding overnight: SSE done, dark red blood from cervical os noted, amnisure negative   -Bedside sono with normal appearing placenta   -Hgb 12.3 > 11.9  -Coags pending   -Vaginal cultures pending   -Ampicillin for GBS unknown   -Continuous FHT and tocometry   -ACS given as patient is at high risk for  delivery   -Procardia given for tocolysis during ACS administration

## 2023-03-15 NOTE — DISCHARGE NOTE ANTEPARTUM - NS MD DC FALL RISK RISK
For information on Fall & Injury Prevention, visit: [email or web address] OR  [email or web address] OR  https://www.cdc.gov/steadi/patient.html

## 2023-03-15 NOTE — PROGRESS NOTE ADULT - PROBLEM SELECTOR PLAN 2
-GDMA, likely poorly controlled given rise in A1C  -A1C 4.8 (1/20) > 6.0 (3/14)  -Continue FS x7: 3/15 Post Lunch 161, Bedtime 156, Fasting 142 (2u given)  -Continue ISS pre-meal   -Diabetic diet ordered   -Anticipate elevated FS in the setting of ACS

## 2023-03-15 NOTE — PROGRESS NOTE ADULT - ASSESSMENT
A/P: Patient is a 31yo  at 34 weeks GA who is admitted for  contractions versus  labor.  A/P: Patient is a 31yo  at 34 weeks GA who is admitted for  labor.     Discussed with Dr. Severino.

## 2023-03-15 NOTE — PROGRESS NOTE ADULT - PROBLEM SELECTOR PLAN 1
-Continuous FHT and tocometry   -Continue PNVs  -EFW -Continuous FHT and tocometry   -Continue PNVs  -EFW done today   -NICU to be consulted  -ACS started -Continuous FHT and tocometry   -Continue PNVs  -EFW done today   -NICU to be consulted  -ACS started  -GBS unknown, result pending

## 2023-03-15 NOTE — PROGRESS NOTE ADULT - SUBJECTIVE AND OBJECTIVE BOX
[facility] PROGRESS NOTE    HD#2    SUBJECTIVE:  Patient endorses bright red blood overnight with leakage of fluid around . She has had dark brown blood after that continuously throughout the night. She was afraid to inform the staff.   She endorses good fetal movement and denies contractions since .    OBJECTIVE:     VITALS:  T(F): 98.2 (23 @ 13:42), Max: 98.2 (23 @ 10:32)  HR: 85 (03-15-23 @ 06:59) (71 - 90)  BP: 96/53 (03-15-23 @ 06:59) (91/53 - 111/75)  RR: 8 (23 @ 18:59) (8 - 19)  SpO2: 99% (23 @ 11:28) (98% - 99%)    I&O's Summary    14 Mar 2023 07:01  -  15 Mar 2023 07:00  --------------------------------------------------------  IN: 625 mL / OUT: 100 mL / NET: 525 mL      MEDICATIONS  (STANDING):  ampicillin  IVPB 1 Gram(s) IV Intermittent every 4 hours  betamethasone Injectable 12 milliGRAM(s) IntraMuscular every 24 hours  dextrose 5%. 1000 milliLiter(s) (50 mL/Hr) IV Continuous <Continuous>  dextrose 5%. 1000 milliLiter(s) (100 mL/Hr) IV Continuous <Continuous>  dextrose 50% Injectable 25 Gram(s) IV Push once  dextrose 50% Injectable 12.5 Gram(s) IV Push once  dextrose 50% Injectable 25 Gram(s) IV Push once  glucagon  Injectable 1 milliGRAM(s) IntraMuscular once  insulin lispro (ADMELOG) corrective regimen sliding scale   SubCutaneous three times a day before meals  insulin lispro (ADMELOG) corrective regimen sliding scale   SubCutaneous at bedtime  lactated ringers. 1000 milliLiter(s) (125 mL/Hr) IV Continuous <Continuous>  NIFEdipine IR 20 milliGRAM(s) Oral every 6 hours  prenatal multivitamin 1 Tablet(s) Oral daily    MEDICATIONS  (PRN):  dextrose Oral Gel 15 Gram(s) Oral once PRN Blood Glucose LESS THAN 70 milliGRAM(s)/deciliter      Physical Exam:  Constitutional: NAD  Abdomen: soft, non-tender, gravid  Extremities: no lower extremity edema or calve tenderness, Johnny's sign negative.  FHT: baseline 130s, moderate variability, +accels, -decels   [place]: occasional contractions     LABS:                        12.3   7.35  )-----------( 249      ( 14 Mar 2023 14:22 )             35.5     03-14    134<L>  |  101  |  5.4<L>  ----------------------------<  126<H>  3.5   |  19.0<L>  |  0.39<L>    Ca    9.0      14 Mar 2023 14:22    TPro  6.8  /  Alb  3.4  /  TBili  0.4  /  DBili  x   /  AST  12  /  ALT  9   /  AlkPhos  325<H>  03-14    PTT - ( 14 Mar 2023 17:20 )  PTT:31.8 sec  Urinalysis Basic - ( 14 Mar 2023 14:22 )    Color: Yellow / Appearance: Slightly Turbid / S.010 / pH: x  Gluc: x / Ketone: Trace  / Bili: Negative / Urobili: Negative mg/dL   Blood: x / Protein: 30 mg/dL / Nitrite: Negative   Leuk Esterase: Moderate / RBC: Negative /HPF / WBC 6-10 /HPF   Sq Epi: x / Non Sq Epi: Few / Bacteria: Few         [facility] PROGRESS NOTE    HD#2    SUBJECTIVE:  Patient endorses bright red blood overnight with leakage of fluid around . She has had dark brown blood after that continuously throughout the night. She was afraid to inform the staff.   She endorses good fetal movement and denies contractions since .    OBJECTIVE:     VITALS:  T(F): 98.2 (23 @ 13:42), Max: 98.2 (23 @ 10:32)  HR: 85 (03-15-23 @ 06:59) (71 - 90)  BP: 96/53 (03-15-23 @ 06:59) (91/53 - 111/75)  RR: 8 (23 @ 18:59) (8 - 19)  SpO2: 99% (23 @ 11:28) (98% - 99%)    I&O's Summary    14 Mar 2023 07:01  -  15 Mar 2023 07:00  --------------------------------------------------------  IN: 625 mL / OUT: 100 mL / NET: 525 mL      MEDICATIONS  (STANDING):  ampicillin  IVPB 1 Gram(s) IV Intermittent every 4 hours  betamethasone Injectable 12 milliGRAM(s) IntraMuscular every 24 hours  dextrose 5%. 1000 milliLiter(s) (50 mL/Hr) IV Continuous <Continuous>  dextrose 5%. 1000 milliLiter(s) (100 mL/Hr) IV Continuous <Continuous>  dextrose 50% Injectable 25 Gram(s) IV Push once  dextrose 50% Injectable 12.5 Gram(s) IV Push once  dextrose 50% Injectable 25 Gram(s) IV Push once  glucagon  Injectable 1 milliGRAM(s) IntraMuscular once  insulin lispro (ADMELOG) corrective regimen sliding scale   SubCutaneous three times a day before meals  insulin lispro (ADMELOG) corrective regimen sliding scale   SubCutaneous at bedtime  lactated ringers. 1000 milliLiter(s) (125 mL/Hr) IV Continuous <Continuous>  NIFEdipine IR 20 milliGRAM(s) Oral every 6 hours  prenatal multivitamin 1 Tablet(s) Oral daily    MEDICATIONS  (PRN):  dextrose Oral Gel 15 Gram(s) Oral once PRN Blood Glucose LESS THAN 70 milliGRAM(s)/deciliter      Physical Exam:  Constitutional: NAD  Abdomen: soft, non-tender, gravid  Extremities: no lower extremity edema or calve tenderness, Johnny's sign negative.  FHT: baseline 130s, moderate variability, +accels, -decels   [place]: occasional contractions   SSE: copious brown discharge, old dark blood in vault and actively coming from os; amnisure negative   SVE: 3/70/-2  Bedside sono: vertex, anterior placenta, placenta is normal appearing without any obvious blood clots within or retroplacentally, fetal breathing and movement seen, good fetal tone, JASWINDER 15    LABS:                        12.3   7.35  )-----------( 249      ( 14 Mar 2023 14:22 )             35.5     03-14    134<L>  |  101  |  5.4<L>  ----------------------------<  126<H>  3.5   |  19.0<L>  |  0.39<L>    Ca    9.0      14 Mar 2023 14:22    TPro  6.8  /  Alb  3.4  /  TBili  0.4  /  DBili  x   /  AST  12  /  ALT  9   /  AlkPhos  325<H>  03-14    PTT - ( 14 Mar 2023 17:20 )  PTT:31.8 sec  Urinalysis Basic - ( 14 Mar 2023 14:22 )    Color: Yellow / Appearance: Slightly Turbid / S.010 / pH: x  Gluc: x / Ketone: Trace  / Bili: Negative / Urobili: Negative mg/dL   Blood: x / Protein: 30 mg/dL / Nitrite: Negative   Leuk Esterase: Moderate / RBC: Negative /HPF / WBC 6-10 /HPF   Sq Epi: x / Non Sq Epi: Few / Bacteria: Few

## 2023-03-15 NOTE — DISCHARGE NOTE ANTEPARTUM - HOSPITAL COURSE
Patient was initially admitted for suspected  labor. She received betamethasone. She then started to have persistent vaginal bleeding with contractions raising suspicion for chronic placental abruption. During her hospital stay, she was started on insulin and became GDMA2. She has no difficulty with ambulation, voiding, or PO intake. Lab values and vital signs are within normal limits prior to discharge.

## 2023-03-15 NOTE — DISCHARGE NOTE ANTEPARTUM - CARE PROVIDER_API CALL
Emil Sauceda)  Obstetrics and Gynecology  [address] [place] [address]  Phone: [phone]  Fax: [phone]  Follow Up Time:

## 2023-03-15 NOTE — DISCHARGE NOTE ANTEPARTUM - CARE PLAN
Principal Discharge DX:	Placental abruption, third trimester  Assessment and plan of treatment:	1) Nothing in the vagina for 6 weeks (including no sex, no tampons, and no douching).  2) Please call your doctor for a follow up your appointment in 1 week.   3) Please continue taking vitamins postpartum.   4) Please call the office sooner if you have heavy vaginal bleeding, severe abdominal pain, or fever > 100.4F.  5) You may resume regular activity as tolerated  Secondary Diagnosis:	Gestational diabetes  Assessment and plan of treatment:	1) Please take your insulin as prescribed.  2) Please follow you finger sticks in the morning and 1 hour after eating.   3) Please follow a diabetic diet.  Secondary Diagnosis:	Placental abruption, third trimester   1

## 2023-03-15 NOTE — CHART NOTE - NSCHARTNOTEFT_GEN_A_CORE
Patient seen and examined at  bedside. She endorses same contractions like pain, unchanged from earlier.     Vital Signs Last 24 Hrs  T(C): 36.1 (15 Mar 2023 10:45), Max: 36.8 (14 Mar 2023 13:42)  T(F): 96.98 (15 Mar 2023 10:45), Max: 98.2 (14 Mar 2023 13:42)  HR: 82 (15 Mar 2023 10:03) (71 - 90)  BP: 102/64 (15 Mar 2023 10:03) (91/53 - 112/75)  RR: 17 (15 Mar 2023 10:45) (8 - 18)    Parameters below as of 14 Mar 2023 13:42  Patient On (Oxygen Delivery Method): room air    General: NAD, well-appearing  Abdominal: soft, non-tender, gravid  Ext: non-tender, non-edematous   FHT: baseline 140s, moderate variability, +accels, -decels   [place]: occasional contractions  SVE: 3/70/-2      A/P: Patient admitted for ACS in the setting of  labor.   -VSS  -SVE unchanged   -BMZ#2 at 1500     Will come off of the monitor to eat and walk around. Will place back on monitor after and potentially transfer to Maternity.     Discussed with Dr. Severino.

## 2023-03-16 LAB
APTT BLD: 28.6 SEC — SIGNIFICANT CHANGE UP (ref 27.5–35.5)
BASOPHILS # BLD AUTO: 0.01 K/UL — SIGNIFICANT CHANGE UP (ref 0–0.2)
BASOPHILS NFR BLD AUTO: 0.1 % — SIGNIFICANT CHANGE UP (ref 0–2)
BLD GP AB SCN SERPL QL: SIGNIFICANT CHANGE UP
CULTURE RESULTS: SIGNIFICANT CHANGE UP
EOSINOPHIL # BLD AUTO: 0.02 K/UL — SIGNIFICANT CHANGE UP (ref 0–0.5)
EOSINOPHIL NFR BLD AUTO: 0.3 % — SIGNIFICANT CHANGE UP (ref 0–6)
FIBRINOGEN PPP-MCNC: 522 MG/DL — HIGH (ref 200–450)
HCT VFR BLD CALC: 32.1 % — LOW (ref 34.5–45)
HGB BLD-MCNC: 11.1 G/DL — LOW (ref 11.5–15.5)
IMM GRANULOCYTES NFR BLD AUTO: 0.9 % — SIGNIFICANT CHANGE UP (ref 0–0.9)
INR BLD: 1.05 RATIO — SIGNIFICANT CHANGE UP (ref 0.88–1.16)
LYMPHOCYTES # BLD AUTO: 1.86 K/UL — SIGNIFICANT CHANGE UP (ref 1–3.3)
LYMPHOCYTES # BLD AUTO: 23.5 % — SIGNIFICANT CHANGE UP (ref 13–44)
MCHC RBC-ENTMCNC: 33 PG — SIGNIFICANT CHANGE UP (ref 27–34)
MCHC RBC-ENTMCNC: 34.6 GM/DL — SIGNIFICANT CHANGE UP (ref 32–36)
MCV RBC AUTO: 95.5 FL — SIGNIFICANT CHANGE UP (ref 80–100)
MONOCYTES # BLD AUTO: 0.43 K/UL — SIGNIFICANT CHANGE UP (ref 0–0.9)
MONOCYTES NFR BLD AUTO: 5.4 % — SIGNIFICANT CHANGE UP (ref 2–14)
NEUTROPHILS # BLD AUTO: 5.51 K/UL — SIGNIFICANT CHANGE UP (ref 1.8–7.4)
NEUTROPHILS NFR BLD AUTO: 69.8 % — SIGNIFICANT CHANGE UP (ref 43–77)
PLATELET # BLD AUTO: 208 K/UL — SIGNIFICANT CHANGE UP (ref 150–400)
PROTHROM AB SERPL-ACNC: 12.2 SEC — SIGNIFICANT CHANGE UP (ref 10.5–13.4)
RBC # BLD: 3.36 M/UL — LOW (ref 3.8–5.2)
RBC # FLD: 13 % — SIGNIFICANT CHANGE UP (ref 10.3–14.5)
SPECIMEN SOURCE: SIGNIFICANT CHANGE UP
WBC # BLD: 7.9 K/UL — SIGNIFICANT CHANGE UP (ref 3.8–10.5)
WBC # FLD AUTO: 7.9 K/UL — SIGNIFICANT CHANGE UP (ref 3.8–10.5)

## 2023-03-16 PROCEDURE — 99232 SBSQ HOSP IP/OBS MODERATE 35: CPT | Mod: GC

## 2023-03-16 RX ORDER — INSULIN LISPRO 100/ML
8 VIAL (ML) SUBCUTANEOUS
Refills: 0 | Status: DISCONTINUED | OUTPATIENT
Start: 2023-03-16 | End: 2023-03-16

## 2023-03-16 RX ORDER — SODIUM CHLORIDE 9 MG/ML
1000 INJECTION, SOLUTION INTRAVENOUS
Refills: 0 | Status: DISCONTINUED | OUTPATIENT
Start: 2023-03-16 | End: 2023-03-18

## 2023-03-16 RX ORDER — INSULIN LISPRO 100/ML
12 VIAL (ML) SUBCUTANEOUS
Refills: 0 | Status: DISCONTINUED | OUTPATIENT
Start: 2023-03-16 | End: 2023-03-17

## 2023-03-16 RX ORDER — INSULIN LISPRO 100/ML
12 VIAL (ML) SUBCUTANEOUS
Refills: 0 | Status: DISCONTINUED | OUTPATIENT
Start: 2023-03-17 | End: 2023-03-17

## 2023-03-16 RX ORDER — DEXTROSE 50 % IN WATER 50 %
25 SYRINGE (ML) INTRAVENOUS ONCE
Refills: 0 | Status: DISCONTINUED | OUTPATIENT
Start: 2023-03-16 | End: 2023-03-18

## 2023-03-16 RX ORDER — HUMAN INSULIN 100 [IU]/ML
10 INJECTION, SUSPENSION SUBCUTANEOUS AT BEDTIME
Refills: 0 | Status: DISCONTINUED | OUTPATIENT
Start: 2023-03-16 | End: 2023-03-17

## 2023-03-16 RX ORDER — ACETAMINOPHEN 500 MG
975 TABLET ORAL ONCE
Refills: 0 | Status: COMPLETED | OUTPATIENT
Start: 2023-03-16 | End: 2023-03-16

## 2023-03-16 RX ADMIN — Medication 8: at 09:05

## 2023-03-16 RX ADMIN — METFORMIN HYDROCHLORIDE 500 MILLIGRAM(S): 850 TABLET ORAL at 06:08

## 2023-03-16 RX ADMIN — Medication 6: at 10:14

## 2023-03-16 RX ADMIN — Medication 12 UNIT(S): at 20:01

## 2023-03-16 RX ADMIN — Medication 975 MILLIGRAM(S): at 22:30

## 2023-03-16 RX ADMIN — Medication 975 MILLIGRAM(S): at 21:29

## 2023-03-16 RX ADMIN — Medication 8 UNIT(S): at 13:57

## 2023-03-16 RX ADMIN — Medication: at 13:45

## 2023-03-16 RX ADMIN — HUMAN INSULIN 10 UNIT(S): 100 INJECTION, SUSPENSION SUBCUTANEOUS at 23:22

## 2023-03-16 NOTE — PROGRESS NOTE ADULT - PROBLEM SELECTOR PLAN 1
-Continuous FHT and tocometry   -Continue PNVs  -EFW 2572g 74% (3/15)  -NICU to be consulted  -ACS given (3/14-3/15)  -GBS negative (3/14)

## 2023-03-16 NOTE — PROGRESS NOTE ADULT - PROBLEM SELECTOR PLAN 2
-GDMA, likely poorly controlled given rise in A1C  -A1C 4.8 (1/20) > 6.0 (3/14)  -Continue FS x7: 3/15 Fasting 135, Post Breakfast 203, Pre Lunch 151, Post Lunch 177, Pre Dinner 182, Post Dinner 224, Bedtime 247; 3/16 Fasting 171  -Continue ISS pre-meal   -Diabetic diet ordered   -Metformin 500mg BID started this AM   -Anticipate elevated FS in the setting of ACS

## 2023-03-16 NOTE — ADVANCED PRACTICE NURSE CONSULT - ASSESSMENT
went to see pt in afternoon, pt is a+ox3 co 0 pain. pt was dx w diabetes in the [facility] about 4 weeks ago. no tx was started. pt has no glucose meter. she was educated in Bangladeshi about the importance of using insulin at this time, pt has some resistance emotional support provided. pt was advised to inject all insulin doses in the stomach and was advised that there is no harm to the baby and there will be less pain for her. pt verbalized understanding. written material about diabetes self management, meal plan and insulin pen use provided in Bangladeshi. went to see pt in afternoon, pt is a+ox3 co 0 pain. pt was dx w diabetes in the [facility] about 4 weeks ago. no tx was started. pt has no glucose meter. she was educated in [place]ian about the importance of using insulin at this time, pt has some resistance emotional support provided. pt was advised to inject all insulin doses in the stomach and was advised that there is no harm to the baby and there will be less pain for her. pt verbalized understanding. written material about diabetes self management, meal plan and insulin pen use provided in [place]ian. pt was encouraged to ambulate after meals in order to improve glycemic control

## 2023-03-16 NOTE — PROGRESS NOTE ADULT - SUBJECTIVE AND OBJECTIVE BOX
[facility] PROGRESS NOTE    HD#3    SUBJECTIVE:  Endorses bright red blood clots this AM.   Patient denies contractions and leakage of fluid overnight.    She endorses good fetal movement.   Denies fevers, chills, nausea and vomiting.     OBJECTIVE:     VITALS:  T(F): 97.8 (23 @ 04:39), Max: 98.6 (03-15-23 @ 17:59)  HR: 84 (23 @ 04:39) (82 - 101)  BP: 102/63 (23 @ 04:39) (92/58 - 112/75)  RR: 18 (23 @ 04:39) (16 - 18)  SpO2: 97% (23 @ 04:39) (97% - 100%)    I&O's Summary    15 Mar 2023 07:01  -  16 Mar 2023 07:00  --------------------------------------------------------  IN: 475 mL / OUT: 0 mL / NET: 475 mL      MEDICATIONS  (STANDING):  acetaminophen     Tablet .. 975 milliGRAM(s) Oral once  dextrose 5%. 1000 milliLiter(s) (50 mL/Hr) IV Continuous <Continuous>  dextrose 5%. 1000 milliLiter(s) (100 mL/Hr) IV Continuous <Continuous>  dextrose 50% Injectable 25 Gram(s) IV Push once  dextrose 50% Injectable 12.5 Gram(s) IV Push once  dextrose 50% Injectable 25 Gram(s) IV Push once  glucagon  Injectable 1 milliGRAM(s) IntraMuscular once  insulin lispro (ADMELOG) corrective regimen sliding scale   SubCutaneous three times a day before meals  metFORMIN 500 milliGRAM(s) Oral every 12 hours    MEDICATIONS  (PRN):  dextrose Oral Gel 15 Gram(s) Oral once PRN Blood Glucose LESS THAN 70 milliGRAM(s)/deciliter      Physical Exam:  Constitutional: NAD  Abdomen: soft, non-tender, gravid  Extremities: no lower extremity edema or calve tenderness, Johnny's sign negative.  FHT: baseline 130s, moderate variability, +accels, -decels   [place]: eddie regularly q 5-10 minutes       LABS:                        11.9   7.78  )-----------( 208      ( 15 Mar 2023 08:45 )             33.6     03-14    134<L>  |  101  |  5.4<L>  ----------------------------<  126<H>  3.5   |  19.0<L>  |  0.39<L>    Ca    9.0      14 Mar 2023 14:22    TPro  6.8  /  Alb  3.4  /  TBili  0.4  /  DBili  x   /  AST  12  /  ALT  9   /  AlkPhos  325<H>  03-14    PT/INR - ( 15 Mar 2023 08:45 )   PT: 12.2 sec;   INR: 1.05 ratio         PTT - ( 15 Mar 2023 08:45 )  PTT:29.6 sec  Urinalysis Basic - ( 14 Mar 2023 14:22 )    Color: Yellow / Appearance: Slightly Turbid / S.010 / pH: x  Gluc: x / Ketone: Trace  / Bili: Negative / Urobili: Negative mg/dL   Blood: x / Protein: 30 mg/dL / Nitrite: Negative   Leuk Esterase: Moderate / RBC: Negative /HPF / WBC 6-10 /HPF   Sq Epi: x / Non Sq Epi: Few / Bacteria: Few

## 2023-03-16 NOTE — PROGRESS NOTE ADULT - ASSESSMENT
A/P: Patient is a 33yo  at 34 weeks 1 day GA who is admitted for  labor.     Discussed with Dr. Severino.

## 2023-03-16 NOTE — CHART NOTE - NSCHARTNOTEFT_GEN_A_CORE
Patient seen and examined at bedside for abdominal pain.     Vital Signs Last 24 Hrs  T(C): 37.1 (16 Mar 2023 08:11), Max: 37.1 (16 Mar 2023 08:11)  T(F): 98.8 (16 Mar 2023 08:11), Max: 98.8 (16 Mar 2023 08:11)  HR: 85 (16 Mar 2023 16:25) (83 - 98)  BP: 110/65 (16 Mar 2023 16:25) (98/61 - 110/65)  RR: 18 (16 Mar 2023 16:00) (18 - 18)  SpO2: 98% (16 Mar 2023 08:16) (97% - 100%)    Parameters below as of 16 Mar 2023 08:11  Patient On (Oxygen Delivery Method): room air    General: NAD, well-appearing  Abdominal: soft, non-tender, gravid   Ext: non-tender, non-edematous   SVE: 3/70/-2  Bedside sono: normal appearing placenta     A/P: Patient admitted for expectant management of suspected chronic placental abruption. Abdominal pain started. SVE unchanged. Bedside sono normal appearing. Tylenol 975mg PO ordered.     Dr. Nick at bedside

## 2023-03-16 NOTE — CHART NOTE - NSCHARTNOTEFT_GEN_A_CORE
Patient seen and examined during AM rounds. Patient continues to endorse vaginal bleeding, but denies contractions and leakage of fluid. Endorses good fetal movement.     Vital Signs Last 24 Hrs  T(C): 37.1 (16 Mar 2023 08:11), Max: 37.1 (16 Mar 2023 08:11)  T(F): 98.8 (16 Mar 2023 08:11), Max: 98.8 (16 Mar 2023 08:11)  HR: 83 (16 Mar 2023 08:16) (83 - 101)  BP: 105/55 (16 Mar 2023 08:11) (92/58 - 105/55)  RR: 18 (16 Mar 2023 09:00) (16 - 18)  SpO2: 98% (16 Mar 2023 08:16) (97% - 100%)    Parameters below as of 16 Mar 2023 08:11  Patient On (Oxygen Delivery Method): room air    General: NAD, well-appearing  Abdominal: soft, gravid   Ext: non-tender, non-edematous   FHT: baseline 140s, moderate variability, +accels, -decels   [place]: regular contractions every 5-10 minutes   SSE: dark brown discharge actively coming out of cervical os  SVE: 3/70/-2     LABS:                        11.1   7.90  )-----------( 208      ( 16 Mar 2023 08:00 )             32.1       Activated Partial Thromboplastin Time in AM (23 @ 08:00)    Activated Partial Thromboplastin Time: 28.6: The recommended therapeutic heparin range (full dose) is 58-99 seconds.  Argatroban range is 1.5 to 3.0 times of the baseline APTT value, not to  exceed 100 seconds.  Routine coagulation results should be interpreted with caution when  taking Factor Xa inhibitors or direct thrombin inhibitors; blood sampling  prior to drug intake is recommended. sec    Prothrombin Time and INR, Plasma in AM (23 @ 08:00)    Prothrombin Time, Plasma: 12.2 sec    INR: 1.05: Recommended targets/ranges for therapeutic INR:  2.0-3.0 Deep vein thrombosis, pulmonary embolism, atrial fibrillation  2.0-3.0 Mechanical aortic valve, antiphospholipid syndrome with previous  arterial or venous thromboembolism  2.5-3.5 Mechanical mitral valve, double mechanical valve (aortic and  mitral positions, high risk valves)  Note: Chest 2012 Feb;141(2 Suppl):7S-47S  Routine coagulation results should be interpreted with caution when  taking Factor Xa inhibitors or direct thrombin inhibitors; blood sampling  prior to drug intake is recommended. ratio    Fibrinogen Clauss: 522: New Clauss fibrinogen methodology with new reference range as of  2023. mg/dL (23 @ 08:00)    A/P: Patient is a 33yo 34w1d GA who is admitted for  labor. Patient continues to endorse vaginal spotting, continued dark red blood noted on SSE and regular contractions. Patient now with high suspicion of chronic versus acute placental abruption. Coagulation studies and hemoglobin stable. Given stable and reassuring fetal and maternal status, will continue with expectant management. Continuous FHT and tocometry. Declines analgesia. Continue pad checks. FS elevated persistently. Will discontinue metformin and start insulin regimen. Admelog 8u pre meals and 10u NPH at bedtime ordered. Nutrition consulted. Otherwise, continue current management.     Discussed with Dr. Severino. Patient seen and examined during AM rounds. Patient continues to endorse vaginal bleeding, but denies contractions and leakage of fluid. Endorses good fetal movement.     Vital Signs Last 24 Hrs  T(C): 37.1 (16 Mar 2023 08:11), Max: 37.1 (16 Mar 2023 08:11)  T(F): 98.8 (16 Mar 2023 08:11), Max: 98.8 (16 Mar 2023 08:11)  HR: 83 (16 Mar 2023 08:16) (83 - 101)  BP: 105/55 (16 Mar 2023 08:11) (92/58 - 105/55)  RR: 18 (16 Mar 2023 09:00) (16 - 18)  SpO2: 98% (16 Mar 2023 08:16) (97% - 100%)    Parameters below as of 16 Mar 2023 08:11  Patient On (Oxygen Delivery Method): room air    General: NAD, well-appearing  Abdominal: soft, gravid   Ext: non-tender, non-edematous   FHT: baseline 140s, moderate variability, +accels, -decels   [place]: regular contractions every 5-10 minutes   SSE: dark brown discharge actively noted in vault and at cervical os  SVE: 3/70/-2     LABS:                        11.1   7.90  )-----------( 208      ( 16 Mar 2023 08:00 )             32.1       Activated Partial Thromboplastin Time in AM (23 @ 08:00)    Activated Partial Thromboplastin Time: 28.6: The recommended therapeutic heparin range (full dose) is 58-99 seconds.  Argatroban range is 1.5 to 3.0 times of the baseline APTT value, not to  exceed 100 seconds.  Routine coagulation results should be interpreted with caution when  taking Factor Xa inhibitors or direct thrombin inhibitors; blood sampling  prior to drug intake is recommended. sec    Prothrombin Time and INR, Plasma in AM (23 @ 08:00)    Prothrombin Time, Plasma: 12.2 sec    INR: 1.05: Recommended targets/ranges for therapeutic INR:  2.0-3.0 Deep vein thrombosis, pulmonary embolism, atrial fibrillation  2.0-3.0 Mechanical aortic valve, antiphospholipid syndrome with previous  arterial or venous thromboembolism  2.5-3.5 Mechanical mitral valve, double mechanical valve (aortic and  mitral positions, high risk valves)  Note: Chest 2012;141(2 Suppl):7S-47S  Routine coagulation results should be interpreted with caution when  taking Factor Xa inhibitors or direct thrombin inhibitors; blood sampling  prior to drug intake is recommended. ratio    Fibrinogen Clauss: 522: New Clauss fibrinogen methodology with new reference range as of  2023. mg/dL (23 @ 08:00)    A/P: Patient is a 31yo 34w1d GA who is admitted for  labor. Patient continues to endorse vaginal spotting, continued dark red blood noted on SSE and regular contractions. Patient now with high suspicion for placental abruption. Coagulation studies and hemoglobin stable. Given stable and reassuring fetal and maternal status, will continue with expectant management. Continuous FHRT and tocometry. Declines need for analgesia. Continue pad checks. FS elevated persistently. Will discontinue metformin and start insulin regimen. Admelog 8u pre meals and 10u NPH at bedtime ordered. Nutrition consulted. Otherwise, continue current management.     Discussed with Dr. Severino.

## 2023-03-16 NOTE — ADVANCED PRACTICE NURSE CONSULT - RECOMMEDATIONS
continue diabetes self management education  pt to inject all inuslin doses while in the hospital using prefilled insulin syrine and rn supervision.   insulin pen teaching including insulin pen setting testing dosing and injection  glucose meter teaching including fs  pls consider dc pt on lispro insulin pen and consider NPH VS levemir insulin pen  please consider nutrition consult  glucometer, strips and lancets for 7xdaily from vivo.  cc- pls consider home care pt is new to insulin

## 2023-03-16 NOTE — PROGRESS NOTE ADULT - PROBLEM SELECTOR PLAN 3
-SVE 0cm to 2cm on 3/14 > 3/70/-2 on 3/15  -No contractions on toco overnight, patient declined contractions overnight   -Vaginal bleeding overnight: SSE done, dark red blood from cervical os noted, amnisure negative   -Bedside sono with normal appearing placenta   -Hgb 12.3 > 11.9 > AM labs pending   -Coags 575 > 638 > AM labs pending   -Vaginal cultures negative   -NST BID   -S/p ACS given as patient is at high risk for  delivery

## 2023-03-17 DIAGNOSIS — O45.93 PREMATURE SEPARATION OF PLACENTA, UNSPECIFIED, THIRD TRIMESTER: ICD-10-CM

## 2023-03-17 LAB
BASOPHILS # BLD AUTO: 0.01 K/UL — SIGNIFICANT CHANGE UP (ref 0–0.2)
BASOPHILS NFR BLD AUTO: 0.2 % — SIGNIFICANT CHANGE UP (ref 0–2)
EOSINOPHIL # BLD AUTO: 0.06 K/UL — SIGNIFICANT CHANGE UP (ref 0–0.5)
EOSINOPHIL NFR BLD AUTO: 0.9 % — SIGNIFICANT CHANGE UP (ref 0–6)
FIBRINOGEN PPP-MCNC: 521 MG/DL — HIGH (ref 200–450)
HCT VFR BLD CALC: 31 % — LOW (ref 34.5–45)
HGB BLD-MCNC: 10.9 G/DL — LOW (ref 11.5–15.5)
IMM GRANULOCYTES NFR BLD AUTO: 1.4 % — HIGH (ref 0–0.9)
LYMPHOCYTES # BLD AUTO: 1.96 K/UL — SIGNIFICANT CHANGE UP (ref 1–3.3)
LYMPHOCYTES # BLD AUTO: 30 % — SIGNIFICANT CHANGE UP (ref 13–44)
MCHC RBC-ENTMCNC: 33.3 PG — SIGNIFICANT CHANGE UP (ref 27–34)
MCHC RBC-ENTMCNC: 35.2 GM/DL — SIGNIFICANT CHANGE UP (ref 32–36)
MCV RBC AUTO: 94.8 FL — SIGNIFICANT CHANGE UP (ref 80–100)
MONOCYTES # BLD AUTO: 0.48 K/UL — SIGNIFICANT CHANGE UP (ref 0–0.9)
MONOCYTES NFR BLD AUTO: 7.3 % — SIGNIFICANT CHANGE UP (ref 2–14)
NEUTROPHILS # BLD AUTO: 3.94 K/UL — SIGNIFICANT CHANGE UP (ref 1.8–7.4)
NEUTROPHILS NFR BLD AUTO: 60.2 % — SIGNIFICANT CHANGE UP (ref 43–77)
PLATELET # BLD AUTO: 207 K/UL — SIGNIFICANT CHANGE UP (ref 150–400)
RBC # BLD: 3.27 M/UL — LOW (ref 3.8–5.2)
RBC # FLD: 13.1 % — SIGNIFICANT CHANGE UP (ref 10.3–14.5)
WBC # BLD: 6.54 K/UL — SIGNIFICANT CHANGE UP (ref 3.8–10.5)
WBC # FLD AUTO: 6.54 K/UL — SIGNIFICANT CHANGE UP (ref 3.8–10.5)

## 2023-03-17 PROCEDURE — 71045 X-RAY EXAM CHEST 1 VIEW: CPT | Mod: 26

## 2023-03-17 PROCEDURE — 99232 SBSQ HOSP IP/OBS MODERATE 35: CPT | Mod: GC

## 2023-03-17 RX ORDER — POLYETHYLENE GLYCOL 3350 17 G/17G
17 POWDER, FOR SOLUTION ORAL ONCE
Refills: 0 | Status: COMPLETED | OUTPATIENT
Start: 2023-03-17 | End: 2023-03-18

## 2023-03-17 RX ORDER — FOLIC ACID 0.8 MG
1 TABLET ORAL
Qty: 0 | Refills: 0 | DISCHARGE

## 2023-03-17 RX ORDER — HUMAN INSULIN 100 [IU]/ML
14 INJECTION, SUSPENSION SUBCUTANEOUS AT BEDTIME
Refills: 0 | Status: DISCONTINUED | OUTPATIENT
Start: 2023-03-17 | End: 2023-03-18

## 2023-03-17 RX ORDER — INSULIN LISPRO 100/ML
18 VIAL (ML) SUBCUTANEOUS
Refills: 0 | Status: DISCONTINUED | OUTPATIENT
Start: 2023-03-17 | End: 2023-03-18

## 2023-03-17 RX ADMIN — Medication 18 UNIT(S): at 18:27

## 2023-03-17 RX ADMIN — Medication 12 UNIT(S): at 12:45

## 2023-03-17 RX ADMIN — HUMAN INSULIN 14 UNIT(S): 100 INJECTION, SUSPENSION SUBCUTANEOUS at 22:37

## 2023-03-17 RX ADMIN — Medication 12 UNIT(S): at 08:33

## 2023-03-17 RX ADMIN — SODIUM CHLORIDE 125 MILLILITER(S): 9 INJECTION, SOLUTION INTRAVENOUS at 01:06

## 2023-03-17 RX ADMIN — Medication 2: at 18:26

## 2023-03-17 RX ADMIN — Medication 2: at 12:50

## 2023-03-17 RX ADMIN — Medication 2: at 08:33

## 2023-03-17 NOTE — PROGRESS NOTE ADULT - PROBLEM SELECTOR PLAN 2
-Based off of persistent vaginal bleeding with abdominal contractions   -BPP biweekly, will do this AM   -Coagulation studies stable   -Hgb stable   -Continuous FHT and tocometry  -S/p ACS given as patient is at high risk for  delivery  -Continue with expectant management

## 2023-03-17 NOTE — CHART NOTE - NSCHARTNOTEFT_GEN_A_CORE
33yo  at 34w2d  PTL  GDMa2 on NPH 10U, lispro 10 tid, Lispro premeal SS    no complaints  f/s 114 pre breakfast    tracing cat 1  ctx 1 in 10

## 2023-03-17 NOTE — OB PROVIDER LABOR PROGRESS NOTE - ASSESSMENT
32 y.o.  at 34w2d admitted with PTL. Cat 1 FHT.     - continuous toco and fetal heart monitoring   D/w Dr. Nick

## 2023-03-17 NOTE — PROGRESS NOTE ADULT - SUBJECTIVE AND OBJECTIVE BOX
[facility] PROGRESS NOTE    HD#4    SUBJECTIVE:  Endorses some vaginal pressure overnight, but doing well this AM.   Patient denies vaginal bleeding, contractions and leakage of fluid.   She endorses good fetal movement.   Denies fevers, chills, nausea and vomiting.   OBJECTIVE:     VITALS:  T(F): 98.6 (03-17-23 @ 04:08), Max: 98.8 (03-16-23 @ 08:11)  HR: 67 (03-17-23 @ 04:08) (67 - 98)  BP: 99/52 (03-17-23 @ 04:08) (95/51 - 113/55)  RR: 18 (03-17-23 @ 04:08) (18 - 18)  SpO2: 98% (03-16-23 @ 08:16) (97% - 99%)    I&O's Summary    15 Mar 2023 07:01  -  16 Mar 2023 07:00  --------------------------------------------------------  IN: 475 mL / OUT: 0 mL / NET: 475 mL    16 Mar 2023 07:01  -  17 Mar 2023 06:48  --------------------------------------------------------  IN: 750 mL / OUT: 0 mL / NET: 750 mL      MEDICATIONS  (STANDING):  acetaminophen     Tablet .. 975 milliGRAM(s) Oral once  dextrose 5%. 1000 milliLiter(s) (50 mL/Hr) IV Continuous <Continuous>  dextrose 5%. 1000 milliLiter(s) (100 mL/Hr) IV Continuous <Continuous>  dextrose 5%. 1000 milliLiter(s) (50 mL/Hr) IV Continuous <Continuous>  dextrose 5%. 1000 milliLiter(s) (100 mL/Hr) IV Continuous <Continuous>  dextrose 50% Injectable 25 Gram(s) IV Push once  dextrose 50% Injectable 25 Gram(s) IV Push once  dextrose 50% Injectable 12.5 Gram(s) IV Push once  dextrose 50% Injectable 25 Gram(s) IV Push once  glucagon  Injectable 1 milliGRAM(s) IntraMuscular once  insulin lispro (ADMELOG) corrective regimen sliding scale   SubCutaneous three times a day before meals  insulin lispro Injectable (ADMELOG) 12 Unit(s) SubCutaneous before breakfast  insulin lispro Injectable (ADMELOG) 12 Unit(s) SubCutaneous before lunch  insulin lispro Injectable (ADMELOG) 12 Unit(s) SubCutaneous before dinner  insulin NPH human recombinant 10 Unit(s) SubCutaneous at bedtime  lactated ringers. 1000 milliLiter(s) (125 mL/Hr) IV Continuous <Continuous>    MEDICATIONS  (PRN):  dextrose Oral Gel 15 Gram(s) Oral once PRN Blood Glucose LESS THAN 70 milliGRAM(s)/deciliter    Physical Exam:  Constitutional: NAD  Abdomen: soft, non-tender, gravid  Extremities: no lower extremity edema or calve tenderness, Johnny's sign negative.  FHT: baseline 130s, moderate variability, +accels, -decels   [place]: no contractions     LABS:                        10.9   6.54  )-----------( 207      ( 17 Mar 2023 04:00 )             31.0           PT/INR - ( 16 Mar 2023 08:00 )   PT: 12.2 sec;   INR: 1.05 ratio         PTT - ( 16 Mar 2023 08:00 )  PTT:28.6 sec      RADIOLOGY & ADDITIONAL TESTS:

## 2023-03-17 NOTE — CHART NOTE - NSCHARTNOTEFT_GEN_A_CORE
Patient seen and evaluated at bedside with Dr. Busch. Patient is doing well. Patient denies vaginal bleeding, contractions and leakage of fluid. She endorses good fetal movement. No other complaints at this time. VSS and PE benign. BPP 8/8 with normal appearing placenta. Plan for transfer to Maternity floor. NST BID. Continue insulin management.     Discussed with Dr. Busch

## 2023-03-17 NOTE — PROGRESS NOTE ADULT - PROBLEM SELECTOR PLAN 4
-GDMA, likely poorly controlled given rise in A1C  -A1C 4.8 (1/20) > 6.0 (3/14)  -Continue FS x7: 3/16 Fasting 171, Post Breakfast 194, Pre Lunch 163, Post Lunch 143, Pre Dinner 156, Post Dinner 170, Bedtime 138; 3/17 Fasting 114  -Continue ISS pre-meal   -Diabetic diet ordered   -Insulin started on 3/16. Current regimen: 12u/12u/12u pre meals, 10u NPH QHS   -Anticipate elevated FS in the setting of ACS

## 2023-03-17 NOTE — PROGRESS NOTE ADULT - PROBLEM SELECTOR PLAN 3
-SVE unchanged for 48 hours (3/70/-2)  -No contractions on toco overnight, patient declined contractions overnight   -Vaginal bleeding overnight: SSE done, dark red blood from cervical os noted, amnisure negative   -Bedside sono with normal appearing placenta   -Hgb 12.3 >> 10.9  -Coags 575 >> 521  -Vaginal cultures negative   -Continuous FHT and tocometry   -S/p ACS given as patient is at high risk for  delivery

## 2023-03-17 NOTE — PROGRESS NOTE ADULT - ASSESSMENT
A/P: Patient is a 33yo  at 34 weeks 2 day GA who is admitted for  labor.     Discussed with Dr. Busch. .

## 2023-03-18 VITALS — WEIGHT: 154.32 LBS

## 2023-03-18 DIAGNOSIS — R76.12 NONSPECIFIC REACTION TO CELL MEDIATED IMMUNITY MEASUREMENT OF GAMMA INTERFERON ANTIGEN RESPONSE WITHOUT ACTIVE TUBERCULOSIS: ICD-10-CM

## 2023-03-18 PROCEDURE — 71045 X-RAY EXAM CHEST 1 VIEW: CPT

## 2023-03-18 PROCEDURE — 86769 SARS-COV-2 COVID-19 ANTIBODY: CPT

## 2023-03-18 PROCEDURE — 86780 TREPONEMA PALLIDUM: CPT

## 2023-03-18 PROCEDURE — 86703 HIV-1/HIV-2 1 RESULT ANTBDY: CPT

## 2023-03-18 PROCEDURE — 80053 COMPREHEN METABOLIC PANEL: CPT

## 2023-03-18 PROCEDURE — 84550 ASSAY OF BLOOD/URIC ACID: CPT

## 2023-03-18 PROCEDURE — 87491 CHLMYD TRACH DNA AMP PROBE: CPT

## 2023-03-18 PROCEDURE — 85730 THROMBOPLASTIN TIME PARTIAL: CPT

## 2023-03-18 PROCEDURE — 85025 COMPLETE CBC W/AUTO DIFF WBC: CPT

## 2023-03-18 PROCEDURE — 86901 BLOOD TYPING SEROLOGIC RH(D): CPT

## 2023-03-18 PROCEDURE — T1013: CPT

## 2023-03-18 PROCEDURE — 82962 GLUCOSE BLOOD TEST: CPT

## 2023-03-18 PROCEDURE — 80307 DRUG TEST PRSMV CHEM ANLYZR: CPT

## 2023-03-18 PROCEDURE — 87070 CULTURE OTHR SPECIMN AEROBIC: CPT

## 2023-03-18 PROCEDURE — U0005: CPT

## 2023-03-18 PROCEDURE — 87800 DETECT AGNT MULT DNA DIREC: CPT

## 2023-03-18 PROCEDURE — 81001 URINALYSIS AUTO W/SCOPE: CPT

## 2023-03-18 PROCEDURE — 83036 HEMOGLOBIN GLYCOSYLATED A1C: CPT

## 2023-03-18 PROCEDURE — 86850 RBC ANTIBODY SCREEN: CPT

## 2023-03-18 PROCEDURE — 87591 N.GONORRHOEAE DNA AMP PROB: CPT

## 2023-03-18 PROCEDURE — 87389 HIV-1 AG W/HIV-1&-2 AB AG IA: CPT

## 2023-03-18 PROCEDURE — 86900 BLOOD TYPING SEROLOGIC ABO: CPT

## 2023-03-18 PROCEDURE — 99232 SBSQ HOSP IP/OBS MODERATE 35: CPT | Mod: GC

## 2023-03-18 PROCEDURE — U0003: CPT

## 2023-03-18 PROCEDURE — 85610 PROTHROMBIN TIME: CPT

## 2023-03-18 PROCEDURE — 85384 FIBRINOGEN ACTIVITY: CPT

## 2023-03-18 PROCEDURE — 36415 COLL VENOUS BLD VENIPUNCTURE: CPT

## 2023-03-18 RX ORDER — HUMAN INSULIN 100 [IU]/ML
18 INJECTION, SUSPENSION SUBCUTANEOUS
Qty: 90 | Refills: 0
Start: 2023-03-18 | End: 2023-06-15

## 2023-03-18 RX ORDER — INSULIN LISPRO 100/ML
18 VIAL (ML) SUBCUTANEOUS
Qty: 4860 | Refills: 0
Start: 2023-03-18 | End: 2023-06-15

## 2023-03-18 RX ADMIN — Medication 18 UNIT(S): at 09:35

## 2023-03-18 RX ADMIN — Medication 4: at 09:30

## 2023-03-18 RX ADMIN — Medication 2: at 13:46

## 2023-03-18 RX ADMIN — POLYETHYLENE GLYCOL 3350 17 GRAM(S): 17 POWDER, FOR SOLUTION ORAL at 11:13

## 2023-03-18 RX ADMIN — Medication 18 UNIT(S): at 13:46

## 2023-03-18 NOTE — PROGRESS NOTE ADULT - PROBLEM SELECTOR PROBLEM 1
34 weeks gestation of pregnancy

## 2023-03-18 NOTE — PROGRESS NOTE ADULT - PROBLEM SELECTOR PROBLEM 5
Class 1 obesity with body mass index (BMI) of 31.0 to 31.9 in adult
Prophylactic measure
Class 1 obesity with body mass index (BMI) of 31.0 to 31.9 in adult
Prophylactic measure

## 2023-03-18 NOTE — DIETITIAN INITIAL EVALUATION ADULT - PERTINENT MEDS FT
MEDICATIONS  (STANDING):  acetaminophen     Tablet .. 975 milliGRAM(s) Oral once  dextrose 5%. 1000 milliLiter(s) (50 mL/Hr) IV Continuous <Continuous>  dextrose 5%. 1000 milliLiter(s) (100 mL/Hr) IV Continuous <Continuous>  dextrose 5%. 1000 milliLiter(s) (50 mL/Hr) IV Continuous <Continuous>  dextrose 5%. 1000 milliLiter(s) (100 mL/Hr) IV Continuous <Continuous>  dextrose 50% Injectable 25 Gram(s) IV Push once  dextrose 50% Injectable 25 Gram(s) IV Push once  dextrose 50% Injectable 12.5 Gram(s) IV Push once  dextrose 50% Injectable 25 Gram(s) IV Push once  glucagon  Injectable 1 milliGRAM(s) IntraMuscular once  insulin lispro (ADMELOG) corrective regimen sliding scale   SubCutaneous three times a day before meals  insulin lispro Injectable (ADMELOG) 18 Unit(s) SubCutaneous three times a day before meals  insulin NPH human recombinant 14 Unit(s) SubCutaneous at bedtime  lactated ringers. 1000 milliLiter(s) (125 mL/Hr) IV Continuous <Continuous>    MEDICATIONS  (PRN):  dextrose Oral Gel 15 Gram(s) Oral once PRN Blood Glucose LESS THAN 70 milliGRAM(s)/deciliter

## 2023-03-18 NOTE — DIETITIAN INITIAL EVALUATION ADULT - OTHER INFO
KATE HUGGINS is a 32y  at 33weeks 6days GA who presents to L&D with back pain due to regular uterine contractions every 5 minutes since yesterday morning (>24h ago). Uterine contraction intensity increased this morning at 4 AM, rates as 6/10 pain. Also endorsing pelvic pressure described as an urge to urinate and have a bowel movement. Patient denies vaginal bleeding or leakage of fluid. She endorses good fetal movement, last felt earlier this morning. Patient denies any trauma, visual disturbances, RUQ pain, epigastric pain, or new-onset edema. She denies any urinary complaints, fevers, chills, nausea, vomiting, chest pain, or palpitations.

## 2023-03-18 NOTE — PROGRESS NOTE ADULT - PROBLEM SELECTOR PLAN 4
-GDMA, likely poorly controlled given rise in A1C  -A1C 4.8 (1/20) > 6.0 (3/14)  -Continue FS x7: 3/17 Fasting 114, Post Breakfast 162, Pre Lunch 122, Post Lunch 145, Pre Dinner 131, Post Dinner 131, Bedtime 128; Awaiting AM fasting  -Continue ISS pre-meal   -Diabetic diet ordered   -Insulin started on 3/16. Current regimen: 12u/12u/12u pre meals, 10u NPH QHS   -Anticipate elevated FS in the setting of ACS -GDMA@  -A1C 4.8 (1/20) > 6.0 (3/14)  -Continue FS x7: 3/17 Fasting 114, Post Breakfast 162, Pre Lunch 122, Post Lunch 145, Pre Dinner 131, Post Dinner 131, Bedtime 128; Awaiting AM fasting  -Continue ISS pre-meal   -Diabetic diet ordered   -Insulin started on 3/16. Current regimen: 18u/18u/18u pre meals, 14u NPH QHS   -Anticipate elevated FS in the setting of ACS

## 2023-03-18 NOTE — PROGRESS NOTE ADULT - SUBJECTIVE AND OBJECTIVE BOX
[facility] PROGRESS NOTE  Patient is a 33yo  at 34 weeks 3 days GA who is admitted for  labor.   HD#5    SUBJECTIVE:    OBJECTIVE:     VITALS:  Vital Signs Last 24 Hrs  T(C): 36.8 (18 Mar 2023 05:04), Max: 37.1 (17 Mar 2023 19:30)  T(F): 98.2 (18 Mar 2023 05:04), Max: 98.8 (17 Mar 2023 19:30)  HR: 96 (18 Mar 2023 05:04) (71 - 98)  BP: 83/54 (18 Mar 2023 05:04) (73/41 - 98/61)  RR: 20 (18 Mar 2023 05:04) (16 - 20)  SpO2: 96% (18 Mar 2023 05:04) (96% - 98%)      I&O's Summary    16 Mar 2023 07:01  -  17 Mar 2023 07:00  --------------------------------------------------------  IN: 750 mL / OUT: 0 mL / NET: 750 mL        MEDICATIONS  (STANDING):  acetaminophen     Tablet .. 975 milliGRAM(s) Oral once  dextrose 5%. 1000 milliLiter(s) (50 mL/Hr) IV Continuous <Continuous>  dextrose 5%. 1000 milliLiter(s) (100 mL/Hr) IV Continuous <Continuous>  dextrose 5%. 1000 milliLiter(s) (50 mL/Hr) IV Continuous <Continuous>  dextrose 5%. 1000 milliLiter(s) (100 mL/Hr) IV Continuous <Continuous>  dextrose 50% Injectable 25 Gram(s) IV Push once  dextrose 50% Injectable 25 Gram(s) IV Push once  dextrose 50% Injectable 12.5 Gram(s) IV Push once  dextrose 50% Injectable 25 Gram(s) IV Push once  glucagon  Injectable 1 milliGRAM(s) IntraMuscular once  insulin lispro (ADMELOG) corrective regimen sliding scale   SubCutaneous three times a day before meals  insulin lispro Injectable (ADMELOG) 12 Unit(s) SubCutaneous before breakfast  insulin lispro Injectable (ADMELOG) 12 Unit(s) SubCutaneous before lunch  insulin lispro Injectable (ADMELOG) 12 Unit(s) SubCutaneous before dinner  insulin NPH human recombinant 10 Unit(s) SubCutaneous at bedtime  lactated ringers. 1000 milliLiter(s) (125 mL/Hr) IV Continuous <Continuous>    MEDICATIONS  (PRN):  dextrose Oral Gel 15 Gram(s) Oral once PRN Blood Glucose LESS THAN 70 milliGRAM(s)/deciliter    Physical Exam:  Constitutional: NAD  Abdomen: soft, non-tender, gravid  Extremities: no lower extremity edema or calve tenderness, Johnny's sign negative.  FHT: baseline 130s, moderate variability, +accels, -decels   [place]: no contractions     LABS: pending     [facility] PROGRESS NOTE  Patient is a 33yo  at 34 weeks 3 days GA admitted for  labor and vaginal bleeding; concern for chronic abruption.   HD#5    SUBJECTIVE:  Patient reports doing well overnight. She denies any abdominal pain or cramping.   +FM, - LOF, - VB, - CTX.   OBJECTIVE:     VITALS:  Vital Signs Last 24 Hrs  T(C): 36.8 (18 Mar 2023 05:04), Max: 37.1 (17 Mar 2023 19:30)  T(F): 98.2 (18 Mar 2023 05:04), Max: 98.8 (17 Mar 2023 19:30)  HR: 96 (18 Mar 2023 05:04) (71 - 98)  BP: 83/54 (18 Mar 2023 05:04) (73/41 - 98/61)  RR: 20 (18 Mar 2023 05:04) (16 - 20)  SpO2: 96% (18 Mar 2023 05:04) (96% - 98%)      I&O's Summary    16 Mar 2023 07:01  -  17 Mar 2023 07:00  --------------------------------------------------------  IN: 750 mL / OUT: 0 mL / NET: 750 mL        MEDICATIONS  (STANDING):  acetaminophen     Tablet .. 975 milliGRAM(s) Oral once  dextrose 5%. 1000 milliLiter(s) (50 mL/Hr) IV Continuous <Continuous>  dextrose 5%. 1000 milliLiter(s) (100 mL/Hr) IV Continuous <Continuous>  dextrose 5%. 1000 milliLiter(s) (50 mL/Hr) IV Continuous <Continuous>  dextrose 5%. 1000 milliLiter(s) (100 mL/Hr) IV Continuous <Continuous>  dextrose 50% Injectable 25 Gram(s) IV Push once  dextrose 50% Injectable 25 Gram(s) IV Push once  dextrose 50% Injectable 12.5 Gram(s) IV Push once  dextrose 50% Injectable 25 Gram(s) IV Push once  glucagon  Injectable 1 milliGRAM(s) IntraMuscular once  insulin lispro (ADMELOG) corrective regimen sliding scale   SubCutaneous three times a day before meals  insulin lispro Injectable (ADMELOG) 12 Unit(s) SubCutaneous before breakfast  insulin lispro Injectable (ADMELOG) 12 Unit(s) SubCutaneous before lunch  insulin lispro Injectable (ADMELOG) 12 Unit(s) SubCutaneous before dinner  insulin NPH human recombinant 10 Unit(s) SubCutaneous at bedtime  lactated ringers. 1000 milliLiter(s) (125 mL/Hr) IV Continuous <Continuous>    MEDICATIONS  (PRN):  dextrose Oral Gel 15 Gram(s) Oral once PRN Blood Glucose LESS THAN 70 milliGRAM(s)/deciliter    Physical Exam:  Constitutional: NAD  Abdomen: soft, non-tender, gravid  Extremities: no lower extremity edema or calve tenderness, Johnny's sign negative.  FHT: baseline 130s, moderate variability, +accels, -decels   [place]: no contractions     LABS: pending

## 2023-03-18 NOTE — PROGRESS NOTE ADULT - PROBLEM SELECTOR PROBLEM 3
labor in third trimester

## 2023-03-18 NOTE — PROGRESS NOTE ADULT - PROBLEM SELECTOR PLAN 5
-SCDs while in bed   -Diabetic diet
-BMI 31  -No concerns at this time
-BMI 31  -No concerns at this time
-SCDs while in bed   -Diabetic diet

## 2023-03-18 NOTE — PROGRESS NOTE ADULT - PROBLEM SELECTOR PLAN 7
-Patient noted to have a positive quantiferon test on outpatient labs with no CXR to rule out TB  - CXR ordered yesterday, will follow results

## 2023-03-18 NOTE — DIETITIAN INITIAL EVALUATION ADULT - ORAL INTAKE PTA/DIET HISTORY
Nutrition consulted. Patient with gestational DM. Unable to complete interview at this time. Written material left with nursing staff in appropriate language. Gestational DM and plate method educational handouts provided. RD to remain available.

## 2023-03-18 NOTE — PROGRESS NOTE ADULT - PROBLEM SELECTOR PROBLEM 4
Gestational diabetes
Class 1 obesity with body mass index (BMI) of 31.0 to 31.9 in adult
Class 1 obesity with body mass index (BMI) of 31.0 to 31.9 in adult
Gestational diabetes

## 2023-03-18 NOTE — DIETITIAN INITIAL EVALUATION ADULT - ADD RECOMMEND
RECOMMENDATIONS:  1) Encouragement to adhere to nutrition recommendations  2) Monitor nutrition related labs and hydration  3) Monitor PO intake

## 2023-03-18 NOTE — PROGRESS NOTE ADULT - ATTENDING COMMENTS
MFM - IUP at 34w1d admitted with arrested  labor.  Reports dark brown bleeding with wipe and on pad overnight.  Bedside SSE with evidence of brown mucous in vault and brown staining on pad.  VE unchanged.  FHRT reactive with contractions q 5 minutes. CBC and coagulation profile stable. Patient advised of findings and suspicion for placental abruption.  Given early gestational age and reassuring maternal and fetal status, continue expectant management.  She is betamethasone complete.  Tocolysis not indicated.  Continuous FHR monitoring at presents.  We reviewed her overall glycemic control and recommendation to begin insulin therapy given persistently elevated BG values.  Recommend nutrition consult and diabetic education.  Will start Admelog 8 units premeal in addition to standing ISS correction for premeal values >100.  Will also start NPH 10 units QHS.  Will provide instruction regarding insulin self administration.  Patient voiced understanding of care plan and is in agreement.  Continue inpatient management.  Ashley Severino MD  Maternal Fetal Medicine
MFM Attending    32 year old  at 34 2/7 weeks admitted with  labor, suspicion for chronic placental abruption, no further cervical change or bleeding overnight or today, FHR reassuring. Patient also with poorly controlled GDM, started on insulin, dose being titrated to goal numbers of <95 fasting and <140 postprandial. At this time, continue inpatient observation with NST twice daily and PRN.     DON Busch
MFM - IUP at 34 weeks admitted with  labor.  Continues to report pain with irregular contractions.  Declines analgesia.  VE with further cervical change to 3 cm this am.  FHRT Category I with ctx noted q 5 - 10 minutes.  Bedside ultrasound with AGA fetus, BPP 8/8, anterior placenta appears sonographically normal.  Betamethasone # 2 today. Discontinue procardia tocolysis when betamethasone complete. Serial VE and expectant management.  Continues ampicillin for GBS unknown.  NICU aware.  Regarding GDM, patient has had elevated BG values and hyperglycemia is expected after corticosteroid administration.  Continue fasting, pre, and post prandial FS with ISS premeal correction for values >100.  Will determine need for standing insulin regimen based on follow up values.  Patient was advised of care plan and potential etiology and all questions answered.  Continue EFM.  Ashley Severino MD  Maternal Fetal Medicine
MFM - IUP at 34 weeks admitted with arrested PTL.  Had episodes of vaginal bleeding while admitted raising concern  for abruption.  Also with elevated BG values requiring insulin supporting diagnosis of A2GDM.  Patient feels well this am.  Reports no bleeding since Thursday am (48 hours) abdominal pain or contractions.  Confirms fetal movement.  FHRT this am reactive without contractions.  BG values remain elevated.  Patient is stable for outpatient management.  Will need 2x/week fetal testing.  Has appt 3/21 for ultrasound and diabetic follow up.  Patient does not have glucometer or testing supplies at home.  Will order from [facility].  Has demonstrated ability to self administer insulin.  Will have nursing reinforce teaching with next meal.  Plan for Admelog 18 units premeals and increase NPH to 18 units QHS.  CXR results pending.  Routine discharge instructions reviewed, as well as indications to return to [facility].  Patient voiced understanding and appreciation for the care she has received.   # [id number].  Ashley Severino MD  Maternal Fetal Medicine

## 2023-03-18 NOTE — PROGRESS NOTE ADULT - ASSESSMENT
A/P: Patient is a 31yo  at 34 weeks 3 days GA who is admitted for  labor.    A/P: Patient is a 33yo  at 34 weeks 3 days GA admitted for  labor and vaginal bleeding; concern for chronic abruption.

## 2023-03-18 NOTE — DIETITIAN INITIAL EVALUATION ADULT - PERTINENT LABORATORY DATA
POCT Blood Glucose.: 123 mg/dL (03-18-23 @ 13:40)  A1C with Estimated Average Glucose Result: 6.0 % (03-14-23 @ 14:22)  A1C with Estimated Average Glucose Result: 4.8 % (01-20-23 @ 13:30)

## 2023-03-18 NOTE — PROGRESS NOTE ADULT - PROBLEM SELECTOR PROBLEM 2
Gestational diabetes
Gestational diabetes
Placental abruption, third trimester
Placental abruption, third trimester

## 2023-03-18 NOTE — PROGRESS NOTE ADULT - PROBLEM SELECTOR PLAN 2
-Based off of persistent vaginal bleeding with abdominal contractions   -BPP biweekly, performed yesterday.   -Coagulation studies stable   -Hgb stable   -NST BID  -S/p ACS given as patient is at high risk for  delivery  -Continue with expectant management -Vvaginal bleeding with abdominal contractions   -BPP biweekly, performed yesterday.   -Coagulation studies stable   -Hgb stable   -NST BID  -S/p ACS given as patient is at high risk for  delivery  -Continue with expectant management

## 2023-03-18 NOTE — PROGRESS NOTE ADULT - PROBLEM SELECTOR PLAN 3
-SVE unchanged for 48 hours (3/70/-2)  -No contractions on toco overnight, patient declined contractions overnight   -Vaginal bleeding overnight: SSE done, dark red blood from cervical os noted, amnisure negative   -Bedside sono with normal appearing placenta   -Hgb 12.3 >> 10.9  -Coags 575 >> 521  - Awaiting labs this AM  -Vaginal cultures negative   -Continuous FHT and tocometry   -S/p ACS given as patient is at high risk for  delivery -SVE unchanged for 48 hours (3/70/-2)  -No contractions on toco overnight, patient declined contractions overnight   -Vaginal bleeding overnight: SSE done, dark red blood from cervical os noted, amnisure negative   -Bedside sono with normal appearing placenta   -Hgb 12.3 >> 10.9  -Coags 575 >> 521  -Vaginal cultures negative   -Continuous FHT and tocometry   -S/p ACS given as patient is at high risk for  delivery

## 2023-03-20 ENCOUNTER — APPOINTMENT (OUTPATIENT)
Dept: ANTEPARTUM | Facility: CLINIC | Age: 33
End: 2023-03-20
Payer: MEDICAID

## 2023-03-20 ENCOUNTER — ASOB RESULT (OUTPATIENT)
Age: 33
End: 2023-03-20

## 2023-03-20 PROCEDURE — 76820 UMBILICAL ARTERY ECHO: CPT | Mod: 59

## 2023-03-20 PROCEDURE — 76816 OB US FOLLOW-UP PER FETUS: CPT

## 2023-03-20 PROCEDURE — 76818 FETAL BIOPHYS PROFILE W/NST: CPT | Mod: 59

## 2023-03-21 ENCOUNTER — APPOINTMENT (OUTPATIENT)
Dept: ANTEPARTUM | Facility: CLINIC | Age: 33
End: 2023-03-21

## 2023-03-21 ENCOUNTER — APPOINTMENT (OUTPATIENT)
Dept: MATERNAL FETAL MEDICINE | Facility: CLINIC | Age: 33
End: 2023-03-21

## 2023-03-23 ENCOUNTER — APPOINTMENT (OUTPATIENT)
Dept: ANTEPARTUM | Facility: CLINIC | Age: 33
End: 2023-03-23

## 2023-03-23 ENCOUNTER — APPOINTMENT (OUTPATIENT)
Dept: MATERNAL FETAL MEDICINE | Facility: CLINIC | Age: 33
End: 2023-03-23

## 2023-03-26 ENCOUNTER — INPATIENT (INPATIENT)
Facility: HOSPITAL | Age: 33
LOS: 2 days | Discharge: HOME | End: 2023-03-29
Attending: OBSTETRICS & GYNECOLOGY | Admitting: OBSTETRICS & GYNECOLOGY
Payer: COMMERCIAL

## 2023-03-26 VITALS — SYSTOLIC BLOOD PRESSURE: 103 MMHG | DIASTOLIC BLOOD PRESSURE: 68 MMHG | HEART RATE: 110 BPM

## 2023-03-26 DIAGNOSIS — O47.03 FALSE LABOR BEFORE 37 COMPLETED WEEKS OF GESTATION, THIRD TRIMESTER: ICD-10-CM

## 2023-03-26 DIAGNOSIS — O26.893 OTHER SPECIFIED PREGNANCY RELATED CONDITIONS, THIRD TRIMESTER: ICD-10-CM

## 2023-03-26 LAB
ALBUMIN SERPL ELPH-MCNC: 3.3 G/DL — SIGNIFICANT CHANGE UP (ref 3.3–5.2)
ALP SERPL-CCNC: 428 U/L — HIGH (ref 40–120)
ALT FLD-CCNC: 11 U/L — SIGNIFICANT CHANGE UP
ANION GAP SERPL CALC-SCNC: 15 MMOL/L — SIGNIFICANT CHANGE UP (ref 5–17)
AST SERPL-CCNC: 11 U/L — SIGNIFICANT CHANGE UP
BASOPHILS # BLD AUTO: 0.01 K/UL — SIGNIFICANT CHANGE UP (ref 0–0.2)
BASOPHILS NFR BLD AUTO: 0.1 % — SIGNIFICANT CHANGE UP (ref 0–2)
BILIRUB SERPL-MCNC: 0.2 MG/DL — LOW (ref 0.4–2)
BLD GP AB SCN SERPL QL: SIGNIFICANT CHANGE UP
BUN SERPL-MCNC: 9.6 MG/DL — SIGNIFICANT CHANGE UP (ref 8–20)
CALCIUM SERPL-MCNC: 9.1 MG/DL — SIGNIFICANT CHANGE UP (ref 8.4–10.5)
CHLORIDE SERPL-SCNC: 99 MMOL/L — SIGNIFICANT CHANGE UP (ref 96–108)
CO2 SERPL-SCNC: 18 MMOL/L — LOW (ref 22–29)
CREAT SERPL-MCNC: 0.46 MG/DL — LOW (ref 0.5–1.3)
EGFR: 130 ML/MIN/1.73M2 — SIGNIFICANT CHANGE UP
EOSINOPHIL # BLD AUTO: 0.04 K/UL — SIGNIFICANT CHANGE UP (ref 0–0.5)
EOSINOPHIL NFR BLD AUTO: 0.5 % — SIGNIFICANT CHANGE UP (ref 0–6)
GLUCOSE BLDC GLUCOMTR-MCNC: 182 MG/DL — HIGH (ref 70–99)
GLUCOSE BLDC GLUCOMTR-MCNC: 228 MG/DL — HIGH (ref 70–99)
GLUCOSE BLDC GLUCOMTR-MCNC: 302 MG/DL — HIGH (ref 70–99)
GLUCOSE SERPL-MCNC: 303 MG/DL — HIGH (ref 70–99)
HCT VFR BLD CALC: 34.5 % — SIGNIFICANT CHANGE UP (ref 34.5–45)
HGB BLD-MCNC: 12.3 G/DL — SIGNIFICANT CHANGE UP (ref 11.5–15.5)
IMM GRANULOCYTES NFR BLD AUTO: 0.5 % — SIGNIFICANT CHANGE UP (ref 0–0.9)
LYMPHOCYTES # BLD AUTO: 1.69 K/UL — SIGNIFICANT CHANGE UP (ref 1–3.3)
LYMPHOCYTES # BLD AUTO: 19.2 % — SIGNIFICANT CHANGE UP (ref 13–44)
MCHC RBC-ENTMCNC: 32.7 PG — SIGNIFICANT CHANGE UP (ref 27–34)
MCHC RBC-ENTMCNC: 35.7 GM/DL — SIGNIFICANT CHANGE UP (ref 32–36)
MCV RBC AUTO: 91.8 FL — SIGNIFICANT CHANGE UP (ref 80–100)
MONOCYTES # BLD AUTO: 0.41 K/UL — SIGNIFICANT CHANGE UP (ref 0–0.9)
MONOCYTES NFR BLD AUTO: 4.7 % — SIGNIFICANT CHANGE UP (ref 2–14)
NEUTROPHILS # BLD AUTO: 6.6 K/UL — SIGNIFICANT CHANGE UP (ref 1.8–7.4)
NEUTROPHILS NFR BLD AUTO: 75 % — SIGNIFICANT CHANGE UP (ref 43–77)
PLATELET # BLD AUTO: 253 K/UL — SIGNIFICANT CHANGE UP (ref 150–400)
POTASSIUM SERPL-MCNC: 3.9 MMOL/L — SIGNIFICANT CHANGE UP (ref 3.5–5.3)
POTASSIUM SERPL-SCNC: 3.9 MMOL/L — SIGNIFICANT CHANGE UP (ref 3.5–5.3)
PROT SERPL-MCNC: 6.6 G/DL — SIGNIFICANT CHANGE UP (ref 6.6–8.7)
RBC # BLD: 3.76 M/UL — LOW (ref 3.8–5.2)
RBC # FLD: 12.9 % — SIGNIFICANT CHANGE UP (ref 10.3–14.5)
SARS-COV-2 RNA SPEC QL NAA+PROBE: SIGNIFICANT CHANGE UP
SODIUM SERPL-SCNC: 132 MMOL/L — LOW (ref 135–145)
WBC # BLD: 8.79 K/UL — SIGNIFICANT CHANGE UP (ref 3.8–10.5)
WBC # FLD AUTO: 8.79 K/UL — SIGNIFICANT CHANGE UP (ref 3.8–10.5)

## 2023-03-26 RX ORDER — SODIUM CHLORIDE 9 MG/ML
1000 INJECTION, SOLUTION INTRAVENOUS
Refills: 0 | Status: DISCONTINUED | OUTPATIENT
Start: 2023-03-26 | End: 2023-03-27

## 2023-03-26 RX ORDER — DEXTROSE 50 % IN WATER 50 %
25 SYRINGE (ML) INTRAVENOUS ONCE
Refills: 0 | Status: DISCONTINUED | OUTPATIENT
Start: 2023-03-26 | End: 2023-03-28

## 2023-03-26 RX ORDER — INSULIN HUMAN 100 [IU]/ML
2 INJECTION, SOLUTION SUBCUTANEOUS
Qty: 50 | Refills: 0 | Status: DISCONTINUED | OUTPATIENT
Start: 2023-03-26 | End: 2023-03-27

## 2023-03-26 RX ORDER — AMPICILLIN TRIHYDRATE 250 MG
1 CAPSULE ORAL EVERY 4 HOURS
Refills: 0 | Status: DISCONTINUED | OUTPATIENT
Start: 2023-03-26 | End: 2023-03-26

## 2023-03-26 RX ORDER — AMPICILLIN TRIHYDRATE 250 MG
2 CAPSULE ORAL ONCE
Refills: 0 | Status: DISCONTINUED | OUTPATIENT
Start: 2023-03-26 | End: 2023-03-26

## 2023-03-26 RX ORDER — GLUCAGON INJECTION, SOLUTION 0.5 MG/.1ML
1 INJECTION, SOLUTION SUBCUTANEOUS ONCE
Refills: 0 | Status: DISCONTINUED | OUTPATIENT
Start: 2023-03-26 | End: 2023-03-28

## 2023-03-26 RX ORDER — OXYTOCIN 10 UNIT/ML
2 VIAL (ML) INJECTION
Qty: 30 | Refills: 0 | Status: DISCONTINUED | OUTPATIENT
Start: 2023-03-26 | End: 2023-03-29

## 2023-03-26 RX ORDER — INSULIN HUMAN 100 [IU]/ML
3 INJECTION, SOLUTION SUBCUTANEOUS
Qty: 50 | Refills: 0 | Status: DISCONTINUED | OUTPATIENT
Start: 2023-03-26 | End: 2023-03-26

## 2023-03-26 RX ORDER — INSULIN HUMAN 100 [IU]/ML
4 INJECTION, SOLUTION SUBCUTANEOUS ONCE
Refills: 0 | Status: DISCONTINUED | OUTPATIENT
Start: 2023-03-26 | End: 2023-03-26

## 2023-03-26 RX ORDER — INSULIN LISPRO 100/ML
VIAL (ML) SUBCUTANEOUS AT BEDTIME
Refills: 0 | Status: DISCONTINUED | OUTPATIENT
Start: 2023-03-26 | End: 2023-03-27

## 2023-03-26 RX ORDER — DEXTROSE 50 % IN WATER 50 %
15 SYRINGE (ML) INTRAVENOUS ONCE
Refills: 0 | Status: DISCONTINUED | OUTPATIENT
Start: 2023-03-26 | End: 2023-03-28

## 2023-03-26 RX ORDER — OXYTOCIN 10 UNIT/ML
333.33 VIAL (ML) INJECTION
Qty: 20 | Refills: 0 | Status: COMPLETED | OUTPATIENT
Start: 2023-03-26 | End: 2023-03-26

## 2023-03-26 RX ORDER — INSULIN LISPRO 100/ML
18 VIAL (ML) SUBCUTANEOUS
Refills: 0 | Status: DISCONTINUED | OUTPATIENT
Start: 2023-03-26 | End: 2023-03-26

## 2023-03-26 RX ORDER — HUMAN INSULIN 100 [IU]/ML
18 INJECTION, SUSPENSION SUBCUTANEOUS AT BEDTIME
Refills: 0 | Status: DISCONTINUED | OUTPATIENT
Start: 2023-03-26 | End: 2023-03-26

## 2023-03-26 RX ORDER — CITRIC ACID/SODIUM CITRATE 300-500 MG
30 SOLUTION, ORAL ORAL ONCE
Refills: 0 | Status: COMPLETED | OUTPATIENT
Start: 2023-03-26 | End: 2023-03-27

## 2023-03-26 RX ORDER — SODIUM CHLORIDE 9 MG/ML
1000 INJECTION INTRAMUSCULAR; INTRAVENOUS; SUBCUTANEOUS
Refills: 0 | Status: DISCONTINUED | OUTPATIENT
Start: 2023-03-26 | End: 2023-03-27

## 2023-03-26 RX ORDER — INSULIN HUMAN 100 [IU]/ML
3 INJECTION, SOLUTION SUBCUTANEOUS ONCE
Refills: 0 | Status: DISCONTINUED | OUTPATIENT
Start: 2023-03-26 | End: 2023-03-26

## 2023-03-26 RX ORDER — INSULIN LISPRO 100/ML
VIAL (ML) SUBCUTANEOUS
Refills: 0 | Status: DISCONTINUED | OUTPATIENT
Start: 2023-03-26 | End: 2023-03-27

## 2023-03-26 RX ORDER — DEXTROSE 50 % IN WATER 50 %
12.5 SYRINGE (ML) INTRAVENOUS ONCE
Refills: 0 | Status: DISCONTINUED | OUTPATIENT
Start: 2023-03-26 | End: 2023-03-28

## 2023-03-26 RX ADMIN — SODIUM CHLORIDE 125 MILLILITER(S): 9 INJECTION, SOLUTION INTRAVENOUS at 17:52

## 2023-03-26 RX ADMIN — Medication 18 UNIT(S): at 18:31

## 2023-03-26 RX ADMIN — SODIUM CHLORIDE 125 MILLILITER(S): 9 INJECTION INTRAMUSCULAR; INTRAVENOUS; SUBCUTANEOUS at 23:58

## 2023-03-26 RX ADMIN — SODIUM CHLORIDE 10 MILLILITER(S): 9 INJECTION, SOLUTION INTRAVENOUS at 23:59

## 2023-03-26 RX ADMIN — HUMAN INSULIN 18 UNIT(S): 100 INJECTION, SUSPENSION SUBCUTANEOUS at 22:06

## 2023-03-26 RX ADMIN — Medication 8: at 18:31

## 2023-03-26 RX ADMIN — INSULIN HUMAN 2 UNIT(S)/HR: 100 INJECTION, SOLUTION SUBCUTANEOUS at 23:59

## 2023-03-26 RX ADMIN — Medication 2 MILLIUNIT(S)/MIN: at 20:31

## 2023-03-26 NOTE — CHART NOTE - NSCHARTNOTEFT_GEN_A_CORE
Patient is admitted for an IOL secondary to PPROM, c/b GDMA2.  Patient noted to have elevated blood glucose of 302 at 1743, and 228 at 2156.   Patient meets criteria for Insulin Infusion.   Will give IVP 4U regular insulin and start infusion at 3cc/hr    D/w Dr. Nick Patient is admitted for an IOL secondary to PPROM, c/b GDMA2.  Patient noted to have elevated blood glucose of 302 at 1743, and 228 at 2156.   Patient meets criteria for Insulin Infusion.   Will give IVP 4U regular insulin and start infusion at 3cc/hr    D/w Dr. Nick    Addendum: Patient's basal glucose was rechecked and noted to be 182. Patient to received 3U regular insulin IVP and start infusion at 2cc/hr.   - will recheck basal glucose q1h per protocol

## 2023-03-26 NOTE — OB PROVIDER H&P - ASSESSMENT
A/P: 32y  at 35w4d GA by LMP admitted for IOL in the setting of PPROM   -Admit to L&D  -Consent  -Admission labs  -IV fluids  -Fetus: Cat I tracing. Continuous toco and fetal monitoring.   -GBS: Negative, no GBS ppx required   -Analgesia: prn  - Will start induction with pitocin     Discussed with Dr. Sauceda

## 2023-03-26 NOTE — OB PROVIDER H&P - NSPREVIOUSLIVEBIRTHSNOWLIVING_OBGYN_ALL_OB_NU
Osman, need help with this patient. Difficult to get history from him. I got permission to speak with father. See chart note. Would you be able to reach out to father and the patient and let me know what's the best next step? Thanks! Reji
2

## 2023-03-26 NOTE — OB PROVIDER H&P - HISTORY OF PRESENT ILLNESS
32y  at 35w4d GA by LMP who presents to L&D for leakage of fluid at 4PM and reports contractions. Patient denies vaginal bleeding. She endorses good fetal movement. Denies fevers, chills, nausea, vomiting, chest pain, SOB, dizziness and headache. No other complaints at this time.     KIERA: 2023  LMP: 2022    Pregnancy course is significant for: GDMA1    POB:   G1: 2009 - normal spontaneous vaginal delivery @ 40w, 7lbs  G2: 2015 - normal spontaneous vaginal delivery @ 36w, [id number]  PGYN: -fibroids/-cysts, denied STD hx, denies abnormal PAPs  PMH: denies   PSH: denies   SH: Denies tobacco use, EtOH use and illicit drug use during the pregnancy; Feels safe at home  Meds: Prenatal vitamins  All: NKDA                  32y  at 35w4d GA by LMP who presents to L&D for leakage of fluid at 4PM and reports contractions. Patient denies vaginal bleeding. She endorses good fetal movement. Denies fevers, chills, nausea, vomiting, chest pain, SOB, dizziness and headache. No other complaints at this time.     KIERA: 2023  LMP: 2022    Pregnancy course is significant for: GDMA2    POB:   G1: 2009 - normal spontaneous vaginal delivery @ 40w, 7lbs  G2: 2015 - normal spontaneous vaginal delivery @ 36w, [id number]  PGYN: -fibroids/-cysts, denied STD hx, denies abnormal PAPs  PMH: denies   PSH: denies   SH: Denies tobacco use, EtOH use and illicit drug use during the pregnancy; Feels safe at home  Meds: Prenatal vitamins, humalin 18 units QHS, humalog 18 units premeals  All: NKDA

## 2023-03-26 NOTE — OB RN PATIENT PROFILE - FUNCTIONAL ASSESSMENT - DAILY ACTIVITY ASSESSMENT TYPE
Impression: Meibomian gland dysfunction of eye: H02.889. Bilateral. Plan: Patient recommended to apply warm compresses at least QD for minimum 10 minutes. Lid scrubs and hygiene were explained. Patient instructed to use artificial tears as needed for comfort. Will continue to observe condition and or symptoms. Admission

## 2023-03-26 NOTE — OB PROVIDER H&P - NSHPPHYSICALEXAM_GEN_ALL_CORE
HR: 110 (03-26-23 @ 17:01) (110 - 110)  BP: 103/68 (03-26-23 @ 17:01) (103/68 - 103/68)      Gen: NAD, well-appearing, AAOx3   Abd: Soft, gravid  Ext: non-tender, non-edematous  SVE:  3/70/-2  Bedside sono:  FHT: baseline 150, moderate variability, +accels, -decels   [place]: q8min

## 2023-03-26 NOTE — OB RN PATIENT PROFILE - FALL HARM RISK - UNIVERSAL INTERVENTIONS
Bed in lowest position, wheels locked, appropriate side rails in place/Call bell, personal items and telephone in reach/Instruct patient to call for assistance before getting out of bed or chair/Non-slip footwear when patient is out of bed/[place] to call system/Physically safe environment - no spills, clutter or unnecessary equipment/Purposeful Proactive Rounding/Room/bathroom lighting operational, light cord in reach

## 2023-03-27 ENCOUNTER — APPOINTMENT (OUTPATIENT)
Dept: ANTEPARTUM | Facility: CLINIC | Age: 33
End: 2023-03-27

## 2023-03-27 LAB
COVID-19 SPIKE DOMAIN AB INTERP: POSITIVE
COVID-19 SPIKE DOMAIN ANTIBODY RESULT: >250 U/ML — HIGH
GLUCOSE BLDC GLUCOMTR-MCNC: 103 MG/DL — HIGH (ref 70–99)
GLUCOSE BLDC GLUCOMTR-MCNC: 109 MG/DL — HIGH (ref 70–99)
GLUCOSE BLDC GLUCOMTR-MCNC: 115 MG/DL — HIGH (ref 70–99)
GLUCOSE BLDC GLUCOMTR-MCNC: 120 MG/DL — HIGH (ref 70–99)
GLUCOSE BLDC GLUCOMTR-MCNC: 121 MG/DL — HIGH (ref 70–99)
GLUCOSE BLDC GLUCOMTR-MCNC: 122 MG/DL — HIGH (ref 70–99)
GLUCOSE BLDC GLUCOMTR-MCNC: 124 MG/DL — HIGH (ref 70–99)
GLUCOSE BLDC GLUCOMTR-MCNC: 129 MG/DL — HIGH (ref 70–99)
GLUCOSE BLDC GLUCOMTR-MCNC: 132 MG/DL — HIGH (ref 70–99)
GLUCOSE BLDC GLUCOMTR-MCNC: 133 MG/DL — HIGH (ref 70–99)
GLUCOSE BLDC GLUCOMTR-MCNC: 134 MG/DL — HIGH (ref 70–99)
GLUCOSE BLDC GLUCOMTR-MCNC: 140 MG/DL — HIGH (ref 70–99)
GLUCOSE BLDC GLUCOMTR-MCNC: 143 MG/DL — HIGH (ref 70–99)
GLUCOSE BLDC GLUCOMTR-MCNC: 148 MG/DL — HIGH (ref 70–99)
GLUCOSE BLDC GLUCOMTR-MCNC: 153 MG/DL — HIGH (ref 70–99)
GLUCOSE BLDC GLUCOMTR-MCNC: 94 MG/DL — SIGNIFICANT CHANGE UP (ref 70–99)
GLUCOSE BLDC GLUCOMTR-MCNC: 97 MG/DL — SIGNIFICANT CHANGE UP (ref 70–99)
SARS-COV-2 IGG+IGM SERPL QL IA: >250 U/ML — HIGH
SARS-COV-2 IGG+IGM SERPL QL IA: POSITIVE
T PALLIDUM AB TITR SER: NEGATIVE — SIGNIFICANT CHANGE UP

## 2023-03-27 PROCEDURE — 88307 TISSUE EXAM BY PATHOLOGIST: CPT | Mod: 26

## 2023-03-27 RX ORDER — INSULIN HUMAN 100 [IU]/ML
1.5 INJECTION, SOLUTION SUBCUTANEOUS
Qty: 50 | Refills: 0 | Status: DISCONTINUED | OUTPATIENT
Start: 2023-03-27 | End: 2023-03-27

## 2023-03-27 RX ORDER — TETANUS TOXOID, REDUCED DIPHTHERIA TOXOID AND ACELLULAR PERTUSSIS VACCINE, ADSORBED 5; 2.5; 8; 8; 2.5 [IU]/.5ML; [IU]/.5ML; UG/.5ML; UG/.5ML; UG/.5ML
0.5 SUSPENSION INTRAMUSCULAR ONCE
Refills: 0 | Status: DISCONTINUED | OUTPATIENT
Start: 2023-03-27 | End: 2023-03-29

## 2023-03-27 RX ORDER — OXYTOCIN 10 UNIT/ML
2 VIAL (ML) INJECTION
Qty: 30 | Refills: 0 | Status: DISCONTINUED | OUTPATIENT
Start: 2023-03-27 | End: 2023-03-27

## 2023-03-27 RX ORDER — TRANEXAMIC ACID 100 MG/ML
1000 INJECTION, SOLUTION INTRAVENOUS ONCE
Refills: 0 | Status: COMPLETED | OUTPATIENT
Start: 2023-03-27 | End: 2023-03-27

## 2023-03-27 RX ORDER — SIMETHICONE 80 MG/1
80 TABLET, CHEWABLE ORAL EVERY 4 HOURS
Refills: 0 | Status: DISCONTINUED | OUTPATIENT
Start: 2023-03-27 | End: 2023-03-29

## 2023-03-27 RX ORDER — AER TRAVELER 0.5 G/1
1 SOLUTION RECTAL; TOPICAL EVERY 4 HOURS
Refills: 0 | Status: DISCONTINUED | OUTPATIENT
Start: 2023-03-27 | End: 2023-03-29

## 2023-03-27 RX ORDER — OXYCODONE HYDROCHLORIDE 5 MG/1
5 TABLET ORAL
Refills: 0 | Status: DISCONTINUED | OUTPATIENT
Start: 2023-03-27 | End: 2023-03-29

## 2023-03-27 RX ORDER — SODIUM CHLORIDE 9 MG/ML
1000 INJECTION INTRAMUSCULAR; INTRAVENOUS; SUBCUTANEOUS
Refills: 0 | Status: DISCONTINUED | OUTPATIENT
Start: 2023-03-27 | End: 2023-03-28

## 2023-03-27 RX ORDER — HYDROCORTISONE 1 %
1 OINTMENT (GRAM) TOPICAL EVERY 6 HOURS
Refills: 0 | Status: DISCONTINUED | OUTPATIENT
Start: 2023-03-27 | End: 2023-03-29

## 2023-03-27 RX ORDER — INSULIN HUMAN 100 [IU]/ML
1 INJECTION, SOLUTION SUBCUTANEOUS
Qty: 50 | Refills: 0 | Status: DISCONTINUED | OUTPATIENT
Start: 2023-03-27 | End: 2023-03-27

## 2023-03-27 RX ORDER — CARBOPROST TROMETHAMINE 250 UG/ML
250 INJECTION, SOLUTION INTRAMUSCULAR ONCE
Refills: 0 | Status: COMPLETED | OUTPATIENT
Start: 2023-03-27 | End: 2023-03-27

## 2023-03-27 RX ORDER — BENZOCAINE 10 %
1 GEL (GRAM) MUCOUS MEMBRANE EVERY 6 HOURS
Refills: 0 | Status: DISCONTINUED | OUTPATIENT
Start: 2023-03-27 | End: 2023-03-29

## 2023-03-27 RX ORDER — ACETAMINOPHEN 500 MG
975 TABLET ORAL
Refills: 0 | Status: DISCONTINUED | OUTPATIENT
Start: 2023-03-27 | End: 2023-03-29

## 2023-03-27 RX ORDER — IBUPROFEN 200 MG
600 TABLET ORAL EVERY 6 HOURS
Refills: 0 | Status: COMPLETED | OUTPATIENT
Start: 2023-03-27 | End: 2024-02-23

## 2023-03-27 RX ORDER — DEXTROSE 50 % IN WATER 50 %
50 SYRINGE (ML) INTRAVENOUS
Refills: 0 | Status: DISCONTINUED | OUTPATIENT
Start: 2023-03-27 | End: 2023-03-28

## 2023-03-27 RX ORDER — DIPHENHYDRAMINE HCL 50 MG
25 CAPSULE ORAL EVERY 6 HOURS
Refills: 0 | Status: DISCONTINUED | OUTPATIENT
Start: 2023-03-27 | End: 2023-03-29

## 2023-03-27 RX ORDER — INSULIN HUMAN 100 [IU]/ML
1.5 INJECTION, SOLUTION SUBCUTANEOUS
Qty: 50 | Refills: 0 | Status: DISCONTINUED | OUTPATIENT
Start: 2023-03-27 | End: 2023-03-28

## 2023-03-27 RX ORDER — MAGNESIUM HYDROXIDE 400 MG/1
30 TABLET, CHEWABLE ORAL
Refills: 0 | Status: DISCONTINUED | OUTPATIENT
Start: 2023-03-27 | End: 2023-03-29

## 2023-03-27 RX ORDER — OXYCODONE HYDROCHLORIDE 5 MG/1
5 TABLET ORAL ONCE
Refills: 0 | Status: DISCONTINUED | OUTPATIENT
Start: 2023-03-27 | End: 2023-03-29

## 2023-03-27 RX ORDER — OXYTOCIN 10 UNIT/ML
2 VIAL (ML) INJECTION
Qty: 30 | Refills: 0 | Status: DISCONTINUED | OUTPATIENT
Start: 2023-03-27 | End: 2023-03-29

## 2023-03-27 RX ORDER — ONDANSETRON 8 MG/1
4 TABLET, FILM COATED ORAL ONCE
Refills: 0 | Status: COMPLETED | OUTPATIENT
Start: 2023-03-27 | End: 2023-03-27

## 2023-03-27 RX ORDER — SODIUM CHLORIDE 9 MG/ML
1000 INJECTION, SOLUTION INTRAVENOUS ONCE
Refills: 0 | Status: COMPLETED | OUTPATIENT
Start: 2023-03-27 | End: 2023-03-27

## 2023-03-27 RX ORDER — DIPHENOXYLATE HCL/ATROPINE 2.5-.025MG
1 TABLET ORAL ONCE
Refills: 0 | Status: DISCONTINUED | OUTPATIENT
Start: 2023-03-27 | End: 2023-03-27

## 2023-03-27 RX ORDER — LANOLIN
1 OINTMENT (GRAM) TOPICAL EVERY 6 HOURS
Refills: 0 | Status: DISCONTINUED | OUTPATIENT
Start: 2023-03-27 | End: 2023-03-29

## 2023-03-27 RX ORDER — DEXTROSE 50 % IN WATER 50 %
25 SYRINGE (ML) INTRAVENOUS
Refills: 0 | Status: DISCONTINUED | OUTPATIENT
Start: 2023-03-27 | End: 2023-03-28

## 2023-03-27 RX ORDER — SODIUM CHLORIDE 9 MG/ML
3 INJECTION INTRAMUSCULAR; INTRAVENOUS; SUBCUTANEOUS EVERY 8 HOURS
Refills: 0 | Status: DISCONTINUED | OUTPATIENT
Start: 2023-03-27 | End: 2023-03-29

## 2023-03-27 RX ORDER — SODIUM CHLORIDE 9 MG/ML
1000 INJECTION, SOLUTION INTRAVENOUS
Refills: 0 | Status: DISCONTINUED | OUTPATIENT
Start: 2023-03-27 | End: 2023-03-28

## 2023-03-27 RX ORDER — KETOROLAC TROMETHAMINE 30 MG/ML
30 SYRINGE (ML) INJECTION ONCE
Refills: 0 | Status: DISCONTINUED | OUTPATIENT
Start: 2023-03-27 | End: 2023-03-27

## 2023-03-27 RX ORDER — PRAMOXINE HYDROCHLORIDE 150 MG/15G
1 AEROSOL, FOAM RECTAL EVERY 4 HOURS
Refills: 0 | Status: DISCONTINUED | OUTPATIENT
Start: 2023-03-27 | End: 2023-03-29

## 2023-03-27 RX ORDER — DIBUCAINE 1 %
1 OINTMENT (GRAM) RECTAL EVERY 6 HOURS
Refills: 0 | Status: DISCONTINUED | OUTPATIENT
Start: 2023-03-27 | End: 2023-03-29

## 2023-03-27 RX ORDER — OXYTOCIN 10 UNIT/ML
41.67 VIAL (ML) INJECTION
Qty: 20 | Refills: 0 | Status: DISCONTINUED | OUTPATIENT
Start: 2023-03-27 | End: 2023-03-29

## 2023-03-27 RX ADMIN — INSULIN HUMAN 1.5 UNIT(S)/HR: 100 INJECTION, SOLUTION SUBCUTANEOUS at 07:44

## 2023-03-27 RX ADMIN — SODIUM CHLORIDE 100 MILLILITER(S): 9 INJECTION, SOLUTION INTRAVENOUS at 02:50

## 2023-03-27 RX ADMIN — SODIUM CHLORIDE 500 MILLILITER(S): 9 INJECTION INTRAMUSCULAR; INTRAVENOUS; SUBCUTANEOUS at 15:40

## 2023-03-27 RX ADMIN — SODIUM CHLORIDE 1000 MILLILITER(S): 9 INJECTION, SOLUTION INTRAVENOUS at 08:40

## 2023-03-27 RX ADMIN — Medication 1 TABLET(S): at 21:44

## 2023-03-27 RX ADMIN — INSULIN HUMAN 1.5 UNIT(S)/HR: 100 INJECTION, SOLUTION SUBCUTANEOUS at 07:42

## 2023-03-27 RX ADMIN — Medication 2 MILLIUNIT(S)/MIN: at 05:16

## 2023-03-27 RX ADMIN — Medication 0.2 MILLIGRAM(S): at 21:13

## 2023-03-27 RX ADMIN — Medication 30 MILLIGRAM(S): at 23:51

## 2023-03-27 RX ADMIN — TRANEXAMIC ACID 220 MILLIGRAM(S): 100 INJECTION, SOLUTION INTRAVENOUS at 20:49

## 2023-03-27 RX ADMIN — Medication 30 MILLILITER(S): at 08:57

## 2023-03-27 RX ADMIN — INSULIN HUMAN 1.5 UNIT(S)/HR: 100 INJECTION, SOLUTION SUBCUTANEOUS at 02:50

## 2023-03-27 RX ADMIN — ONDANSETRON 4 MILLIGRAM(S): 8 TABLET, FILM COATED ORAL at 23:49

## 2023-03-27 RX ADMIN — Medication 1000 MILLIUNIT(S)/MIN: at 21:04

## 2023-03-27 RX ADMIN — SODIUM CHLORIDE 1000 MILLILITER(S): 9 INJECTION, SOLUTION INTRAVENOUS at 15:30

## 2023-03-27 RX ADMIN — Medication 2 MILLIUNIT(S)/MIN: at 16:30

## 2023-03-27 RX ADMIN — INSULIN HUMAN 1 UNIT(S)/HR: 100 INJECTION, SOLUTION SUBCUTANEOUS at 03:00

## 2023-03-27 RX ADMIN — Medication 125 MILLIUNIT(S)/MIN: at 21:44

## 2023-03-27 RX ADMIN — CARBOPROST TROMETHAMINE 250 MICROGRAM(S): 250 INJECTION, SOLUTION INTRAMUSCULAR at 21:19

## 2023-03-27 RX ADMIN — SODIUM CHLORIDE 100 MILLILITER(S): 9 INJECTION, SOLUTION INTRAVENOUS at 13:00

## 2023-03-27 RX ADMIN — SODIUM CHLORIDE 25 MILLILITER(S): 9 INJECTION INTRAMUSCULAR; INTRAVENOUS; SUBCUTANEOUS at 02:50

## 2023-03-27 NOTE — OB RN DELIVERY SUMMARY - NS_SEPSISRSKCALC_OBGYN_ALL_OB_FT
EOS calculated successfully. EOS Risk Factor: 0.5/1000 live births ([facility] [place] national incidence); GA=35w5d; Temp=98.78; ROM=29.033; GBS='Negative'; Antibiotics='No antibiotics or any antibiotics < 2 hrs prior to birth'

## 2023-03-27 NOTE — OB PROVIDER LABOR PROGRESS NOTE - NS_SUBJECTIVE/OBJECTIVE_OBGYN_ALL_OB_FT
Patient is a 33 yo  at 35w4d being induced for PPROM. Pitocin started on 2mU. Patient reports feeling some contractions, denies wanting epidural
Patient is a 33yo  at 35w5d here for IOL for PPROM. Pitocin on 12mU
Patient seen and examined at bedside. She is doing well. No complaints at this time.   Vital Signs Last 24 Hrs  T(C): 36.9 (27 Mar 2023 05:59), Max: 37.0 (27 Mar 2023 04:40)  T(F): 98.42 (27 Mar 2023 05:59), Max: 98.6 (27 Mar 2023 04:40)  HR: 75 (27 Mar 2023 07:33) (73 - 110)  BP: 99/59 (27 Mar 2023 07:33) (97/58 - 121/69)  BP(mean): --  RR: 15 (27 Mar 2023 05:59) (15 - 18)  SpO2: 96% (26 Mar 2023 20:20) (96% - 98%)    Parameters below as of 26 Mar 2023 17:09  Patient On (Oxygen Delivery Method): room air
Patient seen and evaluated at bedside fo category 2 tracing.
Patient seen and examined at bedside for evaluation of intermittent category 2 tracing.

## 2023-03-27 NOTE — OB PROVIDER DELIVERY SUMMARY - NSSELHIDDEN_OBGYN_ALL_OB_FT
[NS_DeliveryAttending1_OBGYN_ALL_OB_FT:[email or web address]=],[NS_DeliveryRN_OBGYN_ALL_OB_FT:[id number]==]

## 2023-03-27 NOTE — OB PROVIDER LABOR PROGRESS NOTE - ASSESSMENT
Pt admitted for induction of labor 2/2 PPROM.   -VSS  -Intermittent cat 2 tracing   -IUPC with amnioinfusion placed

## 2023-03-27 NOTE — OB NEONATOLOGY/PEDIATRICIAN DELIVERY SUMMARY - NSPEDSNEONOTESA_OBGYN_ALL_OB_FT
I was called to attend this  delivery at 35+5 weekGA via . Mother is  32 Year old ,blood type O+, HBsAg negative,RPR NR,HIV Negative, RI,GBS negative. Hx of GDMA2 on insulin drip. Completed Betamethasone 3/16. Mom admitted in labor 3/26 with SROM .24 hrs, no fever. Baby born with spontaneous cry, dried,suction and stimulated. Apgar 9 & 9 . P/E Unremarkable. Plan baby may transition in labor and delivery followed by admission in NBN. Ds monitoring as per protocol. Mother sesired to breast feed.

## 2023-03-27 NOTE — OB PROVIDER LABOR PROGRESS NOTE - ASSESSMENT
Patient admitted for induction of labor secondary to PPROM.   -VSS   -Cat 1 tracing   -Claire regularly   -Forebag ruptured, bloody fluid  -IUPC attempted x2, unsuccessful and blood noted    Pt previously with antepartum admission for suspected chronic placental abruption. Patient counseled on possibility of emergent pCS in the setting of fetal bradycardia if placental abruption present. Patient verbalized understanding. Epidural to be placed. Category 1 tracing now. Continue with induction of labor.     Discussed with Dr. Arora.  Patient admitted for induction of labor secondary to PPROM.   -VSS   -Cat 1 tracing   -Claire regularly   -Forebag ruptured, bloody fluid  -IUPC attempted x2, unsuccessful and blood noted    Pt previously with antepartum admission for suspected chronic placental abruption. Patient counseled on possibility of emergent pCS in the setting of fetal bradycardia if placental abruption present. Patient verbalized understanding. Epidural to be placed. Category 1 tracing now. Continue with induction of labor.     Discussed with Dr. Arora.     Care taken over from Dr. Nick, agree with above

## 2023-03-27 NOTE — OB PROVIDER LABOR PROGRESS NOTE - ASSESSMENT
Pt admitted for induction of labor secondary to PPROM. Prenatal and labor course suspicious for placental abruption. Intermittent category 2 tracing with regular contractions. Moderate variability still present. Will continue with repositioning and induction of labor at this time. Patient counseled again with FOB at bedside regarding plan. Both verbalized understanding and agreement with plan. All questions answered. Will re-examine at 1500.    Discussed with Dr. Arora.

## 2023-03-27 NOTE — OB PROVIDER LABOR PROGRESS NOTE - NS_OBIHICONTRACTIONPATTERNDETAILS_OBGYN_ALL_OB_FT
2-5min
eddie regularly q 2-3 minutes
irregular, q2-8min
eddie regularly q 4 minutes
eddie irregularly

## 2023-03-27 NOTE — OB PROVIDER LABOR PROGRESS NOTE - NS_OBIHIFHRDETAILS_OBGYN_ALL_OB_FT
150/moderate variability/+accels/-decels
baseline 150s, moderate variability, -accels, +intermittent variable decels
145/moderate variability/+accels/-decels
baseline 150s, moderate variability, -accels, + intermittent variable decels
baseline 130s, moderate variability, +accels, -decels

## 2023-03-27 NOTE — OB RN DELIVERY SUMMARY - NSSELHIDDEN_OBGYN_ALL_OB_FT
[NS_DeliveryAttending1_OBGYN_ALL_OB_FT:[id number]=],[NS_DeliveryRN_OBGYN_ALL_OB_FT:[id number]==]

## 2023-03-27 NOTE — OB PROVIDER DELIVERY SUMMARY - NSPROVIDERDELIVERYNOTE_OBGYN_ALL_OB_FT
Patient admitted for PPROM more than 24hrs ago, proceeded to fully dilation and delivered viable  female baby over intact perineum. Vertex delivered without difficulty, no nuchal cord noted, shoulders also delivered without difficulty. Delayed cord clamping of 20s, cord clamp and cut and infant handed to awaiting NICU team. APGARs 8/9.  Cord gases obtained as per protocol. Placenta delivered intact. TXA was givein within 30min of delivery. Pitocin started during shoulder delivery. Fundus was firm, however JESSICA atony noted with brisk bleeding, so Methergine x1, cytotec 1mg OR placed, hemabate x1 also given. Decision was made to place TIFFANIE as bleeding would improve and then would restart with brisk bleeding. TIFFANIE placed as per usual protocol, 150mL of sterile water injected. May catheter placed in urethra. Sono showed appropriate placement. QBL was 410 mL, however there were sponges not measured so QBL 480mL.     I did delivery and placed TIFFANIE and was present throughout entire delivery. Patient updated on events and all questions and concerns were answered.   ppalos

## 2023-03-27 NOTE — CHART NOTE - NSCHARTNOTEFT_GEN_A_CORE
Patient reevaluated at bedside. Mechelle suction cannister noted to have no blood, blood only present in tubing. Mechelle taken off suction and balloon deflated. With fundal pressure, two small trickles <5cc each from vagina. Will reevaluate in 1 hr

## 2023-03-28 LAB
GLUCOSE BLDC GLUCOMTR-MCNC: 105 MG/DL — HIGH (ref 70–99)
HCT VFR BLD CALC: 33.3 % — LOW (ref 34.5–45)
HGB BLD-MCNC: 11.7 G/DL — SIGNIFICANT CHANGE UP (ref 11.5–15.5)

## 2023-03-28 RX ORDER — IBUPROFEN 200 MG
600 TABLET ORAL EVERY 6 HOURS
Refills: 0 | Status: DISCONTINUED | OUTPATIENT
Start: 2023-03-28 | End: 2023-03-29

## 2023-03-28 RX ORDER — OXYCODONE HYDROCHLORIDE 5 MG/1
5 TABLET ORAL ONCE
Refills: 0 | Status: DISCONTINUED | OUTPATIENT
Start: 2023-03-28 | End: 2023-03-29

## 2023-03-28 RX ADMIN — Medication 975 MILLIGRAM(S): at 15:35

## 2023-03-28 RX ADMIN — Medication 975 MILLIGRAM(S): at 09:00

## 2023-03-28 RX ADMIN — SODIUM CHLORIDE 3 MILLILITER(S): 9 INJECTION INTRAMUSCULAR; INTRAVENOUS; SUBCUTANEOUS at 05:38

## 2023-03-28 RX ADMIN — Medication 1 TABLET(S): at 11:34

## 2023-03-28 RX ADMIN — Medication 600 MILLIGRAM(S): at 11:34

## 2023-03-28 RX ADMIN — Medication 30 MILLIGRAM(S): at 00:20

## 2023-03-28 RX ADMIN — Medication 975 MILLIGRAM(S): at 08:05

## 2023-03-28 RX ADMIN — Medication 975 MILLIGRAM(S): at 20:52

## 2023-03-28 RX ADMIN — OXYCODONE HYDROCHLORIDE 5 MILLIGRAM(S): 5 TABLET ORAL at 13:15

## 2023-03-28 RX ADMIN — Medication 600 MILLIGRAM(S): at 18:12

## 2023-03-28 RX ADMIN — OXYCODONE HYDROCHLORIDE 5 MILLIGRAM(S): 5 TABLET ORAL at 12:24

## 2023-03-28 RX ADMIN — Medication 600 MILLIGRAM(S): at 12:30

## 2023-03-28 RX ADMIN — Medication 975 MILLIGRAM(S): at 16:30

## 2023-03-28 NOTE — CHART NOTE - NSCHARTNOTEFT_GEN_A_CORE
Patient evaluated at 0015, 2 hours after placement of dev.   Dev was taken off suction 30 minutes prior, minimal vaginal bleeding noted with fundal pressure.   80cc blood noted in cannister.  Dev was removed and uterus was noted to be firm, minimal bleeding.     Will continue to monitoring; routine postpartum care.     D/w Dr. Fajardo

## 2023-03-28 NOTE — PROGRESS NOTE ADULT - ASSESSMENT
A/P:  KATE HUGGINS is a 32y  with GDMA2 now PPD#1 s/p spontaneous vaginal delivery at 35wd4 gestation in the setting of PPROM, complicated by suspected abruption and JESSICA atony s/p methergine, hemabate, cytotec and TIFFANIE placement.  -Vital signs stable  - AM FS pending  -Hgb: 12.3 -> AM labs pending   -Voiding, tolerating PO  -Advance care as tolerated   -Continue routine postpartum care and education  -Healthy female infant  -Dispo: Continue with inpatient management

## 2023-03-29 ENCOUNTER — TRANSCRIPTION ENCOUNTER (OUTPATIENT)
Age: 33
End: 2023-03-29

## 2023-03-29 VITALS
HEART RATE: 74 BPM | SYSTOLIC BLOOD PRESSURE: 98 MMHG | TEMPERATURE: 98 F | RESPIRATION RATE: 18 BRPM | DIASTOLIC BLOOD PRESSURE: 61 MMHG | OXYGEN SATURATION: 100 %

## 2023-03-29 PROCEDURE — 86901 BLOOD TYPING SEROLOGIC RH(D): CPT

## 2023-03-29 PROCEDURE — 85014 HEMATOCRIT: CPT

## 2023-03-29 PROCEDURE — U0005: CPT

## 2023-03-29 PROCEDURE — 88307 TISSUE EXAM BY PATHOLOGIST: CPT

## 2023-03-29 PROCEDURE — 86780 TREPONEMA PALLIDUM: CPT

## 2023-03-29 PROCEDURE — 36415 COLL VENOUS BLD VENIPUNCTURE: CPT

## 2023-03-29 PROCEDURE — U0003: CPT

## 2023-03-29 PROCEDURE — 90707 MMR VACCINE SC: CPT

## 2023-03-29 PROCEDURE — 85025 COMPLETE CBC W/AUTO DIFF WBC: CPT

## 2023-03-29 PROCEDURE — 85018 HEMOGLOBIN: CPT

## 2023-03-29 PROCEDURE — 82962 GLUCOSE BLOOD TEST: CPT

## 2023-03-29 PROCEDURE — 86769 SARS-COV-2 COVID-19 ANTIBODY: CPT

## 2023-03-29 PROCEDURE — T1013: CPT

## 2023-03-29 PROCEDURE — 86900 BLOOD TYPING SEROLOGIC ABO: CPT

## 2023-03-29 PROCEDURE — 86850 RBC ANTIBODY SCREEN: CPT

## 2023-03-29 PROCEDURE — 80053 COMPREHEN METABOLIC PANEL: CPT

## 2023-03-29 RX ORDER — IBUPROFEN 200 MG
1 TABLET ORAL
Qty: 28 | Refills: 0
Start: 2023-03-29 | End: 2023-04-04

## 2023-03-29 RX ORDER — ACETAMINOPHEN 500 MG
2 TABLET ORAL
Qty: 56 | Refills: 0
Start: 2023-03-29 | End: 2023-04-04

## 2023-03-29 RX ADMIN — Medication 600 MILLIGRAM(S): at 05:17

## 2023-03-29 RX ADMIN — Medication 975 MILLIGRAM(S): at 08:13

## 2023-03-29 RX ADMIN — Medication 975 MILLIGRAM(S): at 02:15

## 2023-03-29 RX ADMIN — Medication 1 TABLET(S): at 15:34

## 2023-03-29 RX ADMIN — Medication 0.5 MILLILITER(S): at 19:20

## 2023-03-29 RX ADMIN — Medication 600 MILLIGRAM(S): at 18:00

## 2023-03-29 RX ADMIN — Medication 600 MILLIGRAM(S): at 00:26

## 2023-03-29 NOTE — PROGRESS NOTE ADULT - ASSESSMENT
A/P:  KATE HUGGINS is a 32y  with GDMA2 now PPD#2 s/p spontaneous vaginal delivery at 35wd4 gestation in the setting of PPROM, complicated by suspected abruption and JESSICA atony s/p methergine, hemabate, cytotec and TIFFANIE placement.  -Vital signs stable  - AM FS wnl  -Hgb: 12.3 ->11.7  -Voiding, tolerating PO  -Advance care as tolerated   -Continue routine postpartum care and education  -Healthy female infant  - Desires nexplanon  -Dispo: Plan for discharge home today pending nexplanon placement

## 2023-03-29 NOTE — DISCHARGE NOTE OB - CARE PROVIDER_API CALL
Emil Sauceda)  Obstetrics and Gynecology  [address] [place] [address]  Phone: [phone]  Fax: [phone]  Follow Up Time: 2 weeks

## 2023-03-29 NOTE — DISCHARGE NOTE OB - HOSPITAL COURSE
Normal spontaneous vaginal delivery at 35w4d in the setting of PPROM, complicated by suspected abruption and JESSICA atony s/p methergine, hemabate x2, [place] cytotec, and TIFFANIE. Postpartum pain is well controlled with PRN medication. She has no difficulty with ambulation, voiding or PO intake. Lab values and vital signs are within normal limits prior to discharge.

## 2023-03-29 NOTE — PROGRESS NOTE ADULT - SUBJECTIVE AND OBJECTIVE BOX
INTERVAL HPI/OVERNIGHT EVENTS:  32y Female s/p labor epidural, dural puncture epidural, spinal  on 03/27/23    Vital Signs Last 24 Hrs  T(C): 37 (28 Mar 2023 05:45), Max: 37.1 (27 Mar 2023 19:16)  T(F): 98.6 (28 Mar 2023 05:45), Max: 98.8 (27 Mar 2023 21:41)  HR: 79 (28 Mar 2023 05:45) (57 - 134)  BP: 96/61 (28 Mar 2023 05:45) (81/45 - 192/91)  BP(mean): --  RR: 16 (28 Mar 2023 05:45) (14 - 17)  SpO2: 97% (28 Mar 2023 05:45) (92% - 99%)    Parameters below as of 28 Mar 2023 05:45  Patient On (Oxygen Delivery Method): room air            Patient satisfied    Patient seen and doing well     No headache      No residual numbness or weakness, sensory and motor function intact    Site not examined     No anesthetic complications or complaints noted or reported                 
KATE HUGGINS is a 32y  with GDMA2 now PPD#1 s/p spontaneous vaginal delivery at 35wd4 gestation in the setting of PPROM, complicated by suspected abruption and JESSICA atony s/p methergine, hemabate, cytotec and TIFFANIE placement.    S:    No acute events overnight.   The patient has no complaints.  Pain controlled with current treatment regimen.   She is ambulating without difficulty and tolerating PO.   + flatus/-BM/+ voiding   She endorses appropriate lochia, which is decreasing.   She is bottle feeding  She denies fevers, chills, nausea and vomiting.   She denies lightheadedness, dizziness, palpitations, chest pain and SOB.     O:    T(C): 37 (23 @ 05:45), Max: 37.1 (23 @ 19:16)  HR: 79 (23 @ 05:45) (57 - 134)  BP: 96/61 (23 @ 05:45) (81/45 - 192/91)  RR: 16 (23 @ 05:45) (14 - 17)  SpO2: 97% (23 @ 05:45) (92% - 100%)    Gen: NAD, AOx3, resting comfortably on room air   Abdomen:  Soft, non-tender, non-distended  Uterus:  Fundus firm below umbilicus  VE:  Expected lochia  Ext:  b/l LE non-tender                           12.3   8.79  )-----------( 253      ( 26 Mar 2023 18:00 )             34.5         132<L>  |  99  |  9.6  ----------------------------<  303<H>  3.9   |  18.0<L>  |  0.46<L>    Ca    9.1      26 Mar 2023 18:00    TPro  6.6  /  Alb  3.3  /  TBili  0.2<L>  /  DBili  x   /  AST  11  /  ALT  11  /  AlkPhos  428<H>        
KATE HUGGINS is a 32y  with GDMA2 now PPD#2 s/p spontaneous vaginal delivery at 35wd4 gestation in the setting of PPROM, complicated by suspected abruption and JESSICA atony s/p methergine, hemabate, cytotec and TIFFANIE placement.    S:    No acute events overnight.   The patient has no complaints.  Pain controlled with current treatment regimen.   She is ambulating without difficulty and tolerating PO.   + flatus/+BM/+ voiding   She endorses appropriate lochia, which is decreasing.   She is bottle feeding  She denies fevers, chills, nausea and vomiting.   She denies lightheadedness, dizziness, palpitations, chest pain and SOB.     O:    Vital Signs Last 24 Hrs  T(C): 36.6 (29 Mar 2023 04:35), Max: 36.9 (28 Mar 2023 11:59)  T(F): 97.9 (29 Mar 2023 04:35), Max: 98.4 (28 Mar 2023 11:59)  HR: 62 (29 Mar 2023 04:35) (62 - 71)  BP: 92/60 (29 Mar 2023 04:35) (88/55 - 93/52)  RR: 16 (29 Mar 2023 04:35) (16 - 18)  SpO2: 97% (29 Mar 2023 04:35) (96% - 98%)      Gen: NAD, AOx3, resting comfortably on room air   Abdomen:  Soft, non-tender, non-distended  Uterus:  Fundus firm below umbilicus  VE:  Expected lochia  Ext:  b/l LE non-tender                                      11.7   x     )-----------( x        ( 28 Mar 2023 07:15 )             33.3

## 2023-03-29 NOTE — DISCHARGE NOTE OB - PATIENT PORTAL LINK FT
You can access the FollowMyHealth Patient Portal offered by [facility] by registering at the following website: [email or web address]. By joining [employer]’s FollowMyHealth portal, you will also be able to view your health information using other applications (apps) compatible with our system.

## 2023-03-29 NOTE — DISCHARGE NOTE OB - MEDICATION SUMMARY - MEDICATIONS TO TAKE
I will START or STAY ON the medications listed below when I get home from the hospital:  None I will START or STAY ON the medications listed below when I get home from the hospital:    acetaminophen 500 mg oral tablet  -- 2 tab(s) by mouth every 6 hours  -- Indication: For pain    ibuprofen 600 mg oral tablet  -- 1 tab(s) by mouth every 6 hours  -- Indication: For pain    Prenatal Multivitamins with Folic Acid 1 mg oral tablet  -- 1 tab(s) by mouth once a day  -- Indication: For postpartum

## 2023-03-29 NOTE — DISCHARGE NOTE OB - MEDICATION SUMMARY - MEDICATIONS TO STOP TAKING
I will STOP taking the medications listed below when I get home from the hospital:    HumuLIN N KwikPen 100 units/mL subcutaneous suspension  -- 18 unit(s) subcutaneous once a day (at bedtime)   -- Do not drink alcoholic beverages when taking this medication.  It is very important that you take or use this exactly as directed.  Do not skip doses or discontinue unless directed by your doctor.  Keep in refrigerator.  Do not freeze.   I will STOP taking the medications listed below when I get home from the hospital:    HumuLIN N KwikPen 100 units/mL subcutaneous suspension  -- 18 unit(s) subcutaneous once a day (at bedtime)   -- Do not drink alcoholic beverages when taking this medication.  It is very important that you take or use this exactly as directed.  Do not skip doses or discontinue unless directed by your doctor.  Keep in refrigerator.  Do not freeze.    HumaLOG KwikPen 100 units/mL injectable solution  -- 18 unit(s) injectable 3 times a day    dme- test strips  -- Apply on skin to affected area 7 times a day    dme- lancets  -- please check glucose fasting, before each meal, 1 hour after meals and at bedtime    dme- glucometer  -- 1 kit, per insurance coverage

## 2023-03-29 NOTE — DISCHARGE NOTE OB - NS MD DC FALL RISK RISK
For information on Fall & Injury Prevention, visit: [email or web address] OR  [email or web address] OR  https://www.cdc.gov/steadi/patient.html

## 2023-03-30 ENCOUNTER — APPOINTMENT (OUTPATIENT)
Dept: ANTEPARTUM | Facility: CLINIC | Age: 33
End: 2023-03-30

## 2023-03-30 ENCOUNTER — APPOINTMENT (OUTPATIENT)
Dept: MATERNAL FETAL MEDICINE | Facility: CLINIC | Age: 33
End: 2023-03-30

## 2023-03-30 LAB — SURGICAL PATHOLOGY STUDY: SIGNIFICANT CHANGE UP

## 2023-04-03 ENCOUNTER — APPOINTMENT (OUTPATIENT)
Dept: ANTEPARTUM | Facility: CLINIC | Age: 33
End: 2023-04-03

## 2023-04-06 ENCOUNTER — APPOINTMENT (OUTPATIENT)
Dept: ANTEPARTUM | Facility: CLINIC | Age: 33
End: 2023-04-06

## 2023-04-10 ENCOUNTER — APPOINTMENT (OUTPATIENT)
Dept: ANTEPARTUM | Facility: CLINIC | Age: 33
End: 2023-04-10

## 2023-04-13 ENCOUNTER — APPOINTMENT (OUTPATIENT)
Dept: ANTEPARTUM | Facility: CLINIC | Age: 33
End: 2023-04-13

## 2023-04-17 ENCOUNTER — APPOINTMENT (OUTPATIENT)
Dept: ANTEPARTUM | Facility: CLINIC | Age: 33
End: 2023-04-17

## 2023-04-20 ENCOUNTER — APPOINTMENT (OUTPATIENT)
Dept: ANTEPARTUM | Facility: CLINIC | Age: 33
End: 2023-04-20

## 2023-04-24 ENCOUNTER — APPOINTMENT (OUTPATIENT)
Dept: ANTEPARTUM | Facility: CLINIC | Age: 33
End: 2023-04-24

## 2023-05-10 NOTE — OB RN PATIENT PROFILE - FUNCTIONAL ASSESSMENT - DAILY ACTIVITY SCORE.
Impression: Dry eye syndrome of bilateral lacrimal glands: H04.123. Plan: Dry eye syndrome requires lubrication of the eye with artificial tears and nighttime ointments or gels. In addition, topical and oral anti-inflammatory medications are usually necessary to treat the associated ocular irritation. Recommend 3-4 drops daily of OTC drops such as PF Systane or PF Refresh. Contact office if dry eye does not improve, worsens or blurs vision. 24

## 2023-12-19 NOTE — OB PROVIDER H&P - PRO PRENATAL LABS ORI SOURCE HIV
[FreeTextEntry1] : Impression: Exacerbation of GERD/dyspepsia probably functional.  Unremarkable EGD x 2.  Plan: Renew pantoprazole 40 mg daily.  Use on demand.  If symptoms recur take a 14-day course followed by on-demand use.  Consider sonogram pending response to PPI
hard copy, drawn during this pregnancy

## 2024-03-29 NOTE — OB RN PATIENT PROFILE - TEACHING/LEARNING RELIGIOUS CONSIDERATIONS
[facility] EMERGENCY ROOM  [address] [place] [address]  Phone: [phone]  Fax: [phone]    Travis Hadorn was seen and treated in our emergency department on 3/29/2024.  He may return to work on 04/01/2024.         Thank you for choosing [facility].    Marlo Diego MD      
none